# Patient Record
Sex: MALE | Race: WHITE | NOT HISPANIC OR LATINO | Employment: OTHER | URBAN - METROPOLITAN AREA
[De-identification: names, ages, dates, MRNs, and addresses within clinical notes are randomized per-mention and may not be internally consistent; named-entity substitution may affect disease eponyms.]

---

## 2019-09-23 ENCOUNTER — HOSPITAL ENCOUNTER (INPATIENT)
Facility: HOSPITAL | Age: 84
LOS: 2 days | Discharge: HOME | End: 2019-09-25
Attending: INTERNAL MEDICINE | Admitting: INTERNAL MEDICINE

## 2019-09-23 DIAGNOSIS — Z78.9 IMPAIRED MOBILITY AND ADLS: Primary | ICD-10-CM

## 2019-09-23 DIAGNOSIS — Z74.09 IMPAIRED MOBILITY AND ADLS: Primary | ICD-10-CM

## 2019-09-23 PROBLEM — I63.411 CEREBROVASCULAR ACCIDENT (CVA) DUE TO EMBOLISM OF RIGHT MIDDLE CEREBRAL ARTERY (HCC): Status: ACTIVE | Noted: 2019-09-23

## 2019-09-23 PROBLEM — I63.412 CEREBROVASCULAR ACCIDENT (CVA) DUE TO EMBOLISM OF LEFT MIDDLE CEREBRAL ARTERY (HCC): Status: ACTIVE | Noted: 2019-09-23

## 2019-09-23 PROBLEM — J98.4: Status: ACTIVE | Noted: 2019-09-23

## 2019-09-23 PROBLEM — I63.419: Status: ACTIVE | Noted: 2019-09-23

## 2019-09-23 PROBLEM — I48.92 ATRIAL FLUTTER (HCC): Status: ACTIVE | Noted: 2019-09-23

## 2019-09-23 LAB
PA ADP PRP-ACNC: 239 PRU
TROPONIN T SERPL-MCNC: 0.01 NG/ML (ref 0–0.03)

## 2019-09-23 PROCEDURE — 85576 BLOOD PLATELET AGGREGATION: CPT | Performed by: FAMILY MEDICINE

## 2019-09-23 PROCEDURE — 63710000001 INSULIN LISPRO (HUMAN) PER 5 UNITS: Performed by: FAMILY MEDICINE

## 2019-09-23 PROCEDURE — 99222 1ST HOSP IP/OBS MODERATE 55: CPT | Performed by: FAMILY MEDICINE

## 2019-09-23 PROCEDURE — 84484 ASSAY OF TROPONIN QUANT: CPT | Performed by: FAMILY MEDICINE

## 2019-09-23 RX ORDER — LISINOPRIL 10 MG/1
10 TABLET ORAL DAILY
Status: ON HOLD | COMMUNITY
End: 2019-11-16 | Stop reason: SDUPTHER

## 2019-09-23 RX ORDER — NICOTINE POLACRILEX 4 MG
15 LOZENGE BUCCAL
Status: DISCONTINUED | OUTPATIENT
Start: 2019-09-23 | End: 2019-09-23 | Stop reason: SDUPTHER

## 2019-09-23 RX ORDER — SODIUM CHLORIDE 0.9 % (FLUSH) 0.9 %
10 SYRINGE (ML) INJECTION AS NEEDED
Status: DISCONTINUED | OUTPATIENT
Start: 2019-09-23 | End: 2019-09-25 | Stop reason: HOSPADM

## 2019-09-23 RX ORDER — DEXTROSE MONOHYDRATE 25 G/50ML
25 INJECTION, SOLUTION INTRAVENOUS
Status: DISCONTINUED | OUTPATIENT
Start: 2019-09-23 | End: 2019-09-23 | Stop reason: SDUPTHER

## 2019-09-23 RX ORDER — ATORVASTATIN CALCIUM 40 MG/1
80 TABLET, FILM COATED ORAL NIGHTLY
Status: DISCONTINUED | OUTPATIENT
Start: 2019-09-23 | End: 2019-09-25 | Stop reason: HOSPADM

## 2019-09-23 RX ORDER — NICOTINE POLACRILEX 4 MG
15 LOZENGE BUCCAL
Status: DISCONTINUED | OUTPATIENT
Start: 2019-09-23 | End: 2019-09-25 | Stop reason: HOSPADM

## 2019-09-23 RX ORDER — SODIUM CHLORIDE 9 MG/ML
50 INJECTION, SOLUTION INTRAVENOUS CONTINUOUS
Status: DISCONTINUED | OUTPATIENT
Start: 2019-09-23 | End: 2019-09-25

## 2019-09-23 RX ORDER — LORATADINE 10 MG/1
10 TABLET ORAL DAILY
Status: ON HOLD | COMMUNITY
End: 2020-04-21

## 2019-09-23 RX ORDER — DEXTROSE MONOHYDRATE 25 G/50ML
25 INJECTION, SOLUTION INTRAVENOUS
Status: DISCONTINUED | OUTPATIENT
Start: 2019-09-23 | End: 2019-09-25 | Stop reason: HOSPADM

## 2019-09-23 RX ORDER — SIMVASTATIN 10 MG
10 TABLET ORAL DAILY
COMMUNITY
End: 2019-09-25 | Stop reason: HOSPADM

## 2019-09-23 RX ORDER — ASPIRIN 300 MG/1
300 SUPPOSITORY RECTAL DAILY
Status: DISCONTINUED | OUTPATIENT
Start: 2019-09-24 | End: 2019-09-24

## 2019-09-23 RX ORDER — SODIUM CHLORIDE 0.9 % (FLUSH) 0.9 %
10 SYRINGE (ML) INJECTION EVERY 12 HOURS SCHEDULED
Status: DISCONTINUED | OUTPATIENT
Start: 2019-09-23 | End: 2019-09-25 | Stop reason: HOSPADM

## 2019-09-23 RX ORDER — GLIPIZIDE 10 MG/1
10 TABLET ORAL
COMMUNITY
End: 2019-11-16 | Stop reason: HOSPADM

## 2019-09-23 RX ORDER — ASPIRIN 81 MG/1
81 TABLET, CHEWABLE ORAL DAILY
Status: DISCONTINUED | OUTPATIENT
Start: 2019-09-24 | End: 2019-09-24

## 2019-09-23 RX ORDER — LISINOPRIL 10 MG/1
10 TABLET ORAL DAILY
Status: DISCONTINUED | OUTPATIENT
Start: 2019-09-24 | End: 2019-09-25 | Stop reason: HOSPADM

## 2019-09-23 RX ORDER — CLOPIDOGREL BISULFATE 75 MG/1
75 TABLET ORAL DAILY
Status: DISCONTINUED | OUTPATIENT
Start: 2019-09-24 | End: 2019-09-24

## 2019-09-23 RX ADMIN — METOPROLOL TARTRATE 12.5 MG: 25 TABLET, FILM COATED ORAL at 23:03

## 2019-09-23 RX ADMIN — ATORVASTATIN CALCIUM 80 MG: 40 TABLET, FILM COATED ORAL at 23:02

## 2019-09-23 RX ADMIN — SODIUM CHLORIDE, PRESERVATIVE FREE 10 ML: 5 INJECTION INTRAVENOUS at 23:03

## 2019-09-24 ENCOUNTER — APPOINTMENT (OUTPATIENT)
Dept: MRI IMAGING | Facility: HOSPITAL | Age: 84
End: 2019-09-24

## 2019-09-24 ENCOUNTER — APPOINTMENT (OUTPATIENT)
Dept: GENERAL RADIOLOGY | Facility: HOSPITAL | Age: 84
End: 2019-09-24

## 2019-09-24 ENCOUNTER — APPOINTMENT (OUTPATIENT)
Dept: CARDIOLOGY | Facility: HOSPITAL | Age: 84
End: 2019-09-24

## 2019-09-24 ENCOUNTER — APPOINTMENT (OUTPATIENT)
Dept: CT IMAGING | Facility: HOSPITAL | Age: 84
End: 2019-09-24

## 2019-09-24 LAB
ANION GAP SERPL CALCULATED.3IONS-SCNC: 12 MMOL/L (ref 5–15)
BASOPHILS # BLD AUTO: 0.04 10*3/MM3 (ref 0–0.2)
BASOPHILS NFR BLD AUTO: 0.5 % (ref 0–1.5)
BUN BLD-MCNC: 26 MG/DL (ref 8–23)
BUN/CREAT SERPL: 17.4 (ref 7–25)
CALCIUM SPEC-SCNC: 9.7 MG/DL (ref 8.6–10.5)
CHLORIDE SERPL-SCNC: 101 MMOL/L (ref 98–107)
CHOLEST SERPL-MCNC: 136 MG/DL (ref 0–200)
CO2 SERPL-SCNC: 28 MMOL/L (ref 22–29)
CREAT BLD-MCNC: 1.49 MG/DL (ref 0.76–1.27)
DEPRECATED RDW RBC AUTO: 51.8 FL (ref 37–54)
EOSINOPHIL # BLD AUTO: 0.34 10*3/MM3 (ref 0–0.4)
EOSINOPHIL NFR BLD AUTO: 4.1 % (ref 0.3–6.2)
ERYTHROCYTE [DISTWIDTH] IN BLOOD BY AUTOMATED COUNT: 14.4 % (ref 12.3–15.4)
GFR SERPL CREATININE-BSD FRML MDRD: 45 ML/MIN/1.73
GLUCOSE BLD-MCNC: 142 MG/DL (ref 65–99)
GLUCOSE BLDC GLUCOMTR-MCNC: 104 MG/DL (ref 70–130)
GLUCOSE BLDC GLUCOMTR-MCNC: 153 MG/DL (ref 70–130)
GLUCOSE BLDC GLUCOMTR-MCNC: 234 MG/DL (ref 70–130)
GLUCOSE BLDC GLUCOMTR-MCNC: 285 MG/DL (ref 70–130)
HBA1C MFR BLD: 6.6 % (ref 4.8–5.6)
HCT VFR BLD AUTO: 41.7 % (ref 37.5–51)
HDLC SERPL-MCNC: 48 MG/DL (ref 40–60)
HGB BLD-MCNC: 13.1 G/DL (ref 13–17.7)
IMM GRANULOCYTES # BLD AUTO: 0.02 10*3/MM3 (ref 0–0.05)
IMM GRANULOCYTES NFR BLD AUTO: 0.2 % (ref 0–0.5)
IRON 24H UR-MRATE: 56 MCG/DL (ref 59–158)
IRON SATN MFR SERPL: 19 % (ref 20–50)
LDLC SERPL CALC-MCNC: 71 MG/DL (ref 0–100)
LDLC/HDLC SERPL: 1.48 {RATIO}
LYMPHOCYTES # BLD AUTO: 1.93 10*3/MM3 (ref 0.7–3.1)
LYMPHOCYTES NFR BLD AUTO: 23.5 % (ref 19.6–45.3)
MCH RBC QN AUTO: 31 PG (ref 26.6–33)
MCHC RBC AUTO-ENTMCNC: 31.4 G/DL (ref 31.5–35.7)
MCV RBC AUTO: 98.8 FL (ref 79–97)
MONOCYTES # BLD AUTO: 0.67 10*3/MM3 (ref 0.1–0.9)
MONOCYTES NFR BLD AUTO: 8.2 % (ref 5–12)
NEUTROPHILS # BLD AUTO: 5.21 10*3/MM3 (ref 1.7–7)
NEUTROPHILS NFR BLD AUTO: 63.5 % (ref 42.7–76)
NRBC BLD AUTO-RTO: 0 /100 WBC (ref 0–0.2)
PLATELET # BLD AUTO: 194 10*3/MM3 (ref 140–450)
PMV BLD AUTO: 10.4 FL (ref 6–12)
POTASSIUM BLD-SCNC: 5 MMOL/L (ref 3.5–5.2)
RBC # BLD AUTO: 4.22 10*6/MM3 (ref 4.14–5.8)
SODIUM BLD-SCNC: 141 MMOL/L (ref 136–145)
T4 FREE SERPL-MCNC: 1.34 NG/DL (ref 0.93–1.7)
TIBC SERPL-MCNC: 291 MCG/DL (ref 298–536)
TRANSFERRIN SERPL-MCNC: 195 MG/DL (ref 200–360)
TRIGL SERPL-MCNC: 86 MG/DL (ref 0–150)
TROPONIN T SERPL-MCNC: <0.01 NG/ML (ref 0–0.03)
TROPONIN T SERPL-MCNC: <0.01 NG/ML (ref 0–0.03)
TSH SERPL DL<=0.05 MIU/L-ACNC: 6.48 UIU/ML (ref 0.27–4.2)
VLDLC SERPL-MCNC: 17.2 MG/DL
WBC NRBC COR # BLD: 8.21 10*3/MM3 (ref 3.4–10.8)

## 2019-09-24 PROCEDURE — 71250 CT THORAX DX C-: CPT

## 2019-09-24 PROCEDURE — 84466 ASSAY OF TRANSFERRIN: CPT | Performed by: FAMILY MEDICINE

## 2019-09-24 PROCEDURE — 92610 EVALUATE SWALLOWING FUNCTION: CPT

## 2019-09-24 PROCEDURE — 84439 ASSAY OF FREE THYROXINE: CPT | Performed by: INTERNAL MEDICINE

## 2019-09-24 PROCEDURE — 85025 COMPLETE CBC W/AUTO DIFF WBC: CPT | Performed by: FAMILY MEDICINE

## 2019-09-24 PROCEDURE — 83036 HEMOGLOBIN GLYCOSYLATED A1C: CPT | Performed by: FAMILY MEDICINE

## 2019-09-24 PROCEDURE — 84443 ASSAY THYROID STIM HORMONE: CPT | Performed by: FAMILY MEDICINE

## 2019-09-24 PROCEDURE — 80048 BASIC METABOLIC PNL TOTAL CA: CPT | Performed by: FAMILY MEDICINE

## 2019-09-24 PROCEDURE — 70450 CT HEAD/BRAIN W/O DYE: CPT

## 2019-09-24 PROCEDURE — 82962 GLUCOSE BLOOD TEST: CPT

## 2019-09-24 PROCEDURE — 99222 1ST HOSP IP/OBS MODERATE 55: CPT | Performed by: INTERNAL MEDICINE

## 2019-09-24 PROCEDURE — 84484 ASSAY OF TROPONIN QUANT: CPT | Performed by: FAMILY MEDICINE

## 2019-09-24 PROCEDURE — 99233 SBSQ HOSP IP/OBS HIGH 50: CPT | Performed by: INTERNAL MEDICINE

## 2019-09-24 PROCEDURE — 93005 ELECTROCARDIOGRAM TRACING: CPT | Performed by: INTERNAL MEDICINE

## 2019-09-24 PROCEDURE — 74018 RADEX ABDOMEN 1 VIEW: CPT

## 2019-09-24 PROCEDURE — 80061 LIPID PANEL: CPT | Performed by: FAMILY MEDICINE

## 2019-09-24 PROCEDURE — 71045 X-RAY EXAM CHEST 1 VIEW: CPT

## 2019-09-24 PROCEDURE — 93010 ELECTROCARDIOGRAM REPORT: CPT | Performed by: INTERNAL MEDICINE

## 2019-09-24 PROCEDURE — 99223 1ST HOSP IP/OBS HIGH 75: CPT | Performed by: PSYCHIATRY & NEUROLOGY

## 2019-09-24 PROCEDURE — 83540 ASSAY OF IRON: CPT | Performed by: FAMILY MEDICINE

## 2019-09-24 PROCEDURE — 97165 OT EVAL LOW COMPLEX 30 MIN: CPT

## 2019-09-24 PROCEDURE — 97161 PT EVAL LOW COMPLEX 20 MIN: CPT

## 2019-09-24 PROCEDURE — 92523 SPEECH SOUND LANG COMPREHEN: CPT

## 2019-09-24 RX ORDER — CHOLECALCIFEROL (VITAMIN D3) 125 MCG
2.5 CAPSULE ORAL NIGHTLY PRN
Status: DISCONTINUED | OUTPATIENT
Start: 2019-09-24 | End: 2019-09-25 | Stop reason: HOSPADM

## 2019-09-24 RX ADMIN — APIXABAN 5 MG: 5 TABLET, FILM COATED ORAL at 20:49

## 2019-09-24 RX ADMIN — INSULIN LISPRO 3 UNITS: 100 INJECTION, SOLUTION INTRAVENOUS; SUBCUTANEOUS at 12:18

## 2019-09-24 RX ADMIN — LISINOPRIL 10 MG: 10 TABLET ORAL at 09:11

## 2019-09-24 RX ADMIN — CLOPIDOGREL BISULFATE 75 MG: 75 TABLET ORAL at 09:12

## 2019-09-24 RX ADMIN — SODIUM CHLORIDE 50 ML/HR: 9 INJECTION, SOLUTION INTRAVENOUS at 01:24

## 2019-09-24 RX ADMIN — SODIUM CHLORIDE, PRESERVATIVE FREE 10 ML: 5 INJECTION INTRAVENOUS at 20:50

## 2019-09-24 RX ADMIN — INSULIN LISPRO 4 UNITS: 100 INJECTION, SOLUTION INTRAVENOUS; SUBCUTANEOUS at 20:49

## 2019-09-24 RX ADMIN — ASPIRIN 81 MG 81 MG: 81 TABLET ORAL at 09:11

## 2019-09-24 RX ADMIN — METOPROLOL TARTRATE 12.5 MG: 25 TABLET, FILM COATED ORAL at 09:12

## 2019-09-24 RX ADMIN — SODIUM CHLORIDE 50 ML/HR: 9 INJECTION, SOLUTION INTRAVENOUS at 12:00

## 2019-09-24 RX ADMIN — ATORVASTATIN CALCIUM 80 MG: 40 TABLET, FILM COATED ORAL at 20:47

## 2019-09-24 RX ADMIN — MELATONIN TAB 5 MG 2.5 MG: 5 TAB at 20:47

## 2019-09-24 RX ADMIN — METOPROLOL TARTRATE 12.5 MG: 25 TABLET, FILM COATED ORAL at 20:47

## 2019-09-25 ENCOUNTER — APPOINTMENT (OUTPATIENT)
Dept: CARDIOLOGY | Facility: HOSPITAL | Age: 84
End: 2019-09-25

## 2019-09-25 VITALS
HEART RATE: 73 BPM | HEIGHT: 69 IN | DIASTOLIC BLOOD PRESSURE: 60 MMHG | WEIGHT: 154 LBS | RESPIRATION RATE: 16 BRPM | TEMPERATURE: 98 F | SYSTOLIC BLOOD PRESSURE: 134 MMHG | BODY MASS INDEX: 22.81 KG/M2 | OXYGEN SATURATION: 96 %

## 2019-09-25 PROBLEM — I65.22 OCCLUSION OF LEFT CAROTID ARTERY: Status: ACTIVE | Noted: 2019-09-25

## 2019-09-25 LAB
ANION GAP SERPL CALCULATED.3IONS-SCNC: 14 MMOL/L (ref 5–15)
ASCENDING AORTA: 3.2 CM
BH CV ECHO MEAS - AO MAX PG (FULL): 2.1 MMHG
BH CV ECHO MEAS - AO MAX PG: 4.3 MMHG
BH CV ECHO MEAS - AO MEAN PG (FULL): 1.4 MMHG
BH CV ECHO MEAS - AO MEAN PG: 2.3 MMHG
BH CV ECHO MEAS - AO ROOT AREA (BSA CORRECTED): 1.8
BH CV ECHO MEAS - AO ROOT AREA: 8.4 CM^2
BH CV ECHO MEAS - AO ROOT DIAM: 3.3 CM
BH CV ECHO MEAS - AO V2 MAX: 103.2 CM/SEC
BH CV ECHO MEAS - AO V2 MEAN: 71.2 CM/SEC
BH CV ECHO MEAS - AO V2 VTI: 19.6 CM
BH CV ECHO MEAS - ASC AORTA: 3.2 CM
BH CV ECHO MEAS - AVA(I,A): 2.8 CM^2
BH CV ECHO MEAS - AVA(I,D): 2.8 CM^2
BH CV ECHO MEAS - AVA(V,A): 2.8 CM^2
BH CV ECHO MEAS - AVA(V,D): 2.8 CM^2
BH CV ECHO MEAS - BSA(HAYCOCK): 1.8 M^2
BH CV ECHO MEAS - BSA(HAYCOCK): 1.8 M^2
BH CV ECHO MEAS - BSA: 1.8 M^2
BH CV ECHO MEAS - BSA: 1.8 M^2
BH CV ECHO MEAS - BZI_BMI: 22.7 KILOGRAMS/M^2
BH CV ECHO MEAS - BZI_BMI: 22.7 KILOGRAMS/M^2
BH CV ECHO MEAS - BZI_METRIC_HEIGHT: 175.3 CM
BH CV ECHO MEAS - BZI_METRIC_HEIGHT: 175.3 CM
BH CV ECHO MEAS - BZI_METRIC_WEIGHT: 69.9 KG
BH CV ECHO MEAS - BZI_METRIC_WEIGHT: 69.9 KG
BH CV ECHO MEAS - EDV(CUBED): 51.2 ML
BH CV ECHO MEAS - EDV(MOD-SP2): 24 ML
BH CV ECHO MEAS - EDV(MOD-SP4): 42 ML
BH CV ECHO MEAS - EDV(TEICH): 58.6 ML
BH CV ECHO MEAS - EF(CUBED): 63.2 %
BH CV ECHO MEAS - EF(MOD-BP): 60 %
BH CV ECHO MEAS - EF(MOD-SP2): 79.2 %
BH CV ECHO MEAS - EF(MOD-SP4): 59.5 %
BH CV ECHO MEAS - EF(TEICH): 55.5 %
BH CV ECHO MEAS - ESV(CUBED): 18.8 ML
BH CV ECHO MEAS - ESV(MOD-SP2): 5 ML
BH CV ECHO MEAS - ESV(MOD-SP4): 17 ML
BH CV ECHO MEAS - ESV(TEICH): 26.1 ML
BH CV ECHO MEAS - FS: 28.3 %
BH CV ECHO MEAS - IVS/LVPW: 0.98
BH CV ECHO MEAS - IVSD: 1.2 CM
BH CV ECHO MEAS - LA DIMENSION: 4 CM
BH CV ECHO MEAS - LA/AO: 1
BH CV ECHO MEAS - LAD MAJOR: 7.3 CM
BH CV ECHO MEAS - LAT PEAK E' VEL: 10.4 CM/SEC
BH CV ECHO MEAS - LATERAL E/E' RATIO: 9.4
BH CV ECHO MEAS - LV DIASTOLIC VOL/BSA (35-75): 22.7 ML/M^2
BH CV ECHO MEAS - LV MASS(C)D: 148.1 GRAMS
BH CV ECHO MEAS - LV MASS(C)DI: 80.1 GRAMS/M^2
BH CV ECHO MEAS - LV MAX PG: 2.2 MMHG
BH CV ECHO MEAS - LV MEAN PG: 0.87 MMHG
BH CV ECHO MEAS - LV SYSTOLIC VOL/BSA (12-30): 9.2 ML/M^2
BH CV ECHO MEAS - LV V1 MAX: 74 CM/SEC
BH CV ECHO MEAS - LV V1 MEAN: 42.7 CM/SEC
BH CV ECHO MEAS - LV V1 VTI: 14.1 CM
BH CV ECHO MEAS - LVIDD: 3.7 CM
BH CV ECHO MEAS - LVIDS: 2.7 CM
BH CV ECHO MEAS - LVLD AP2: 6.1 CM
BH CV ECHO MEAS - LVLD AP4: 6.4 CM
BH CV ECHO MEAS - LVLS AP2: 5.3 CM
BH CV ECHO MEAS - LVLS AP4: 5.6 CM
BH CV ECHO MEAS - LVOT AREA (M): 3.8 CM^2
BH CV ECHO MEAS - LVOT AREA: 3.8 CM^2
BH CV ECHO MEAS - LVOT DIAM: 2.2 CM
BH CV ECHO MEAS - LVPWD: 1.2 CM
BH CV ECHO MEAS - MED PEAK E' VEL: 7.2 CM/SEC
BH CV ECHO MEAS - MEDIAL E/E' RATIO: 13.5
BH CV ECHO MEAS - MV A MAX VEL: 60.7 CM/SEC
BH CV ECHO MEAS - MV DEC SLOPE: 677 CM/SEC^2
BH CV ECHO MEAS - MV E MAX VEL: 99.7 CM/SEC
BH CV ECHO MEAS - MV E/A: 1.6
BH CV ECHO MEAS - MV MAX PG: 3.9 MMHG
BH CV ECHO MEAS - MV MEAN PG: 1.7 MMHG
BH CV ECHO MEAS - MV V2 MAX: 98.8 CM/SEC
BH CV ECHO MEAS - MV V2 MEAN: 62.9 CM/SEC
BH CV ECHO MEAS - MV V2 VTI: 18.2 CM
BH CV ECHO MEAS - MVA(VTI): 3 CM^2
BH CV ECHO MEAS - PA ACC SLOPE: 729.9 CM/SEC^2
BH CV ECHO MEAS - PA ACC TIME: 0.1 SEC
BH CV ECHO MEAS - PA MAX PG: 3.6 MMHG
BH CV ECHO MEAS - PA PR(ACCEL): 33.1 MMHG
BH CV ECHO MEAS - PA V2 MAX: 94.5 CM/SEC
BH CV ECHO MEAS - PI END-D VEL: 121.6 CM/SEC
BH CV ECHO MEAS - SI(AO): 88.7 ML/M^2
BH CV ECHO MEAS - SI(CUBED): 17.5 ML/M^2
BH CV ECHO MEAS - SI(LVOT): 29.3 ML/M^2
BH CV ECHO MEAS - SI(MOD-SP2): 10.3 ML/M^2
BH CV ECHO MEAS - SI(MOD-SP4): 13.5 ML/M^2
BH CV ECHO MEAS - SI(TEICH): 17.6 ML/M^2
BH CV ECHO MEAS - SV(AO): 164 ML
BH CV ECHO MEAS - SV(CUBED): 32.3 ML
BH CV ECHO MEAS - SV(LVOT): 54.2 ML
BH CV ECHO MEAS - SV(MOD-SP2): 19 ML
BH CV ECHO MEAS - SV(MOD-SP4): 25 ML
BH CV ECHO MEAS - SV(TEICH): 32.6 ML
BH CV ECHO MEASUREMENTS AVERAGE E/E' RATIO: 11.33
BH CV VAS BP RIGHT ARM: NORMAL MMHG
BH CV XLRA - RV BASE: 3.6 CM
BH CV XLRA - RV LENGTH: 4.9 CM
BH CV XLRA - RV MID: 2.5 CM
BH CV XLRA - TDI S': 8.66 CM/SEC
BH CV XLRA MEAS LEFT DIST CCA EDV: 9.5 CM/SEC
BH CV XLRA MEAS LEFT DIST CCA PSV: 38.6 CM/SEC
BH CV XLRA MEAS LEFT MID CCA EDV: 8.3 CM/SEC
BH CV XLRA MEAS LEFT MID CCA PSV: 68.8 CM/SEC
BH CV XLRA MEAS LEFT PROX CCA PSV: 89.6 CM/SEC
BH CV XLRA MEAS LEFT PROX ECA PSV: 314.7 CM/SEC
BH CV XLRA MEAS LEFT PROX SCLA PSV: 157.8 CM/SEC
BH CV XLRA MEAS LEFT VERTEBRAL A EDV: 6.6 CM/SEC
BH CV XLRA MEAS LEFT VERTEBRAL A PSV: 33.8 CM/SEC
BH CV XLRA MEAS RIGHT CCA RATIO VEL: 98.8 CM/SEC
BH CV XLRA MEAS RIGHT DIST CCA EDV: 23.1 CM/SEC
BH CV XLRA MEAS RIGHT DIST CCA PSV: 99.5 CM/SEC
BH CV XLRA MEAS RIGHT DIST ICA EDV: 22.2 CM/SEC
BH CV XLRA MEAS RIGHT DIST ICA PSV: 90.4 CM/SEC
BH CV XLRA MEAS RIGHT ICA RATIO VEL: 244 CM/SEC
BH CV XLRA MEAS RIGHT ICA/CCA RATIO: 2.5
BH CV XLRA MEAS RIGHT MID CCA EDV: 22.4 CM/SEC
BH CV XLRA MEAS RIGHT MID CCA PSV: 99.5 CM/SEC
BH CV XLRA MEAS RIGHT MID ICA EDV: 60.8 CM/SEC
BH CV XLRA MEAS RIGHT MID ICA PSV: 221 CM/SEC
BH CV XLRA MEAS RIGHT PROX CCA EDV: 19.8 CM/SEC
BH CV XLRA MEAS RIGHT PROX CCA PSV: 87.6 CM/SEC
BH CV XLRA MEAS RIGHT PROX ECA PSV: 156.6 CM/SEC
BH CV XLRA MEAS RIGHT PROX ICA EDV: 71.2 CM/SEC
BH CV XLRA MEAS RIGHT PROX ICA PSV: 234.3 CM/SEC
BH CV XLRA MEAS RIGHT PROX SCLA PSV: 180.1 CM/SEC
BH CV XLRA MEAS RIGHT VERTEBRAL A EDV: 14.1 CM/SEC
BH CV XLRA MEAS RIGHT VERTEBRAL A PSV: 44.1 CM/SEC
BUN BLD-MCNC: 36 MG/DL (ref 8–23)
BUN/CREAT SERPL: 25.4 (ref 7–25)
CALCIUM SPEC-SCNC: 8.7 MG/DL (ref 8.6–10.5)
CHLORIDE SERPL-SCNC: 103 MMOL/L (ref 98–107)
CO2 SERPL-SCNC: 22 MMOL/L (ref 22–29)
CREAT BLD-MCNC: 1.42 MG/DL (ref 0.76–1.27)
DEPRECATED RDW RBC AUTO: 50.4 FL (ref 37–54)
ERYTHROCYTE [DISTWIDTH] IN BLOOD BY AUTOMATED COUNT: 13.9 % (ref 12.3–15.4)
GFR SERPL CREATININE-BSD FRML MDRD: 47 ML/MIN/1.73
GLUCOSE BLD-MCNC: 132 MG/DL (ref 65–99)
GLUCOSE BLDC GLUCOMTR-MCNC: 143 MG/DL (ref 70–130)
GLUCOSE BLDC GLUCOMTR-MCNC: 285 MG/DL (ref 70–130)
HCT VFR BLD AUTO: 36.9 % (ref 37.5–51)
HGB BLD-MCNC: 11.7 G/DL (ref 13–17.7)
LEFT ATRIUM VOLUME INDEX: 34.6 ML/M2
LV EF 2D ECHO EST: 58 %
MCH RBC QN AUTO: 31.1 PG (ref 26.6–33)
MCHC RBC AUTO-ENTMCNC: 31.7 G/DL (ref 31.5–35.7)
MCV RBC AUTO: 98.1 FL (ref 79–97)
PLATELET # BLD AUTO: 180 10*3/MM3 (ref 140–450)
PMV BLD AUTO: 10.5 FL (ref 6–12)
POTASSIUM BLD-SCNC: 4.4 MMOL/L (ref 3.5–5.2)
RBC # BLD AUTO: 3.76 10*6/MM3 (ref 4.14–5.8)
RIGHT ARM BP: NORMAL MMHG
SODIUM BLD-SCNC: 139 MMOL/L (ref 136–145)
WBC NRBC COR # BLD: 7.27 10*3/MM3 (ref 3.4–10.8)

## 2019-09-25 PROCEDURE — 93010 ELECTROCARDIOGRAM REPORT: CPT | Performed by: INTERNAL MEDICINE

## 2019-09-25 PROCEDURE — 80048 BASIC METABOLIC PNL TOTAL CA: CPT | Performed by: INTERNAL MEDICINE

## 2019-09-25 PROCEDURE — 99231 SBSQ HOSP IP/OBS SF/LOW 25: CPT | Performed by: PSYCHIATRY & NEUROLOGY

## 2019-09-25 PROCEDURE — 93005 ELECTROCARDIOGRAM TRACING: CPT | Performed by: NURSE PRACTITIONER

## 2019-09-25 PROCEDURE — 99232 SBSQ HOSP IP/OBS MODERATE 35: CPT | Performed by: INTERNAL MEDICINE

## 2019-09-25 PROCEDURE — 93880 EXTRACRANIAL BILAT STUDY: CPT | Performed by: INTERNAL MEDICINE

## 2019-09-25 PROCEDURE — 93880 EXTRACRANIAL BILAT STUDY: CPT

## 2019-09-25 PROCEDURE — 82962 GLUCOSE BLOOD TEST: CPT

## 2019-09-25 PROCEDURE — 85027 COMPLETE CBC AUTOMATED: CPT | Performed by: INTERNAL MEDICINE

## 2019-09-25 PROCEDURE — 99239 HOSP IP/OBS DSCHRG MGMT >30: CPT | Performed by: INTERNAL MEDICINE

## 2019-09-25 PROCEDURE — 93306 TTE W/DOPPLER COMPLETE: CPT

## 2019-09-25 PROCEDURE — 93306 TTE W/DOPPLER COMPLETE: CPT | Performed by: INTERNAL MEDICINE

## 2019-09-25 RX ORDER — ATORVASTATIN CALCIUM 80 MG/1
80 TABLET, FILM COATED ORAL NIGHTLY
Qty: 30 TABLET | Refills: 0 | Status: SHIPPED | OUTPATIENT
Start: 2019-09-25 | End: 2019-10-17 | Stop reason: SDUPTHER

## 2019-09-25 RX ORDER — DONEPEZIL HYDROCHLORIDE 10 MG/1
5 TABLET, FILM COATED ORAL NIGHTLY
Status: DISCONTINUED | OUTPATIENT
Start: 2019-09-25 | End: 2019-09-25 | Stop reason: HOSPADM

## 2019-09-25 RX ORDER — DONEPEZIL HYDROCHLORIDE 5 MG/1
5 TABLET, FILM COATED ORAL NIGHTLY
Qty: 30 TABLET | Refills: 0 | Status: SHIPPED | OUTPATIENT
Start: 2019-09-25 | End: 2019-10-17 | Stop reason: SDUPTHER

## 2019-09-25 RX ORDER — ASPIRIN 81 MG/1
81 TABLET ORAL DAILY
Status: DISCONTINUED | OUTPATIENT
Start: 2019-09-25 | End: 2019-09-25 | Stop reason: HOSPADM

## 2019-09-25 RX ORDER — ASPIRIN 81 MG/1
81 TABLET ORAL DAILY
Qty: 30 TABLET | Refills: 0 | Status: ON HOLD | OUTPATIENT
Start: 2019-09-25 | End: 2019-11-15

## 2019-09-25 RX ADMIN — APIXABAN 5 MG: 5 TABLET, FILM COATED ORAL at 09:05

## 2019-09-25 RX ADMIN — SODIUM CHLORIDE, PRESERVATIVE FREE 10 ML: 5 INJECTION INTRAVENOUS at 09:06

## 2019-09-25 RX ADMIN — LISINOPRIL 10 MG: 10 TABLET ORAL at 09:05

## 2019-09-25 RX ADMIN — METOPROLOL TARTRATE 12.5 MG: 25 TABLET, FILM COATED ORAL at 09:05

## 2019-09-25 RX ADMIN — INSULIN LISPRO 4 UNITS: 100 INJECTION, SOLUTION INTRAVENOUS; SUBCUTANEOUS at 12:19

## 2019-09-26 ENCOUNTER — READMISSION MANAGEMENT (OUTPATIENT)
Dept: CALL CENTER | Facility: HOSPITAL | Age: 84
End: 2019-09-26

## 2019-09-30 ENCOUNTER — READMISSION MANAGEMENT (OUTPATIENT)
Dept: CALL CENTER | Facility: HOSPITAL | Age: 84
End: 2019-09-30

## 2019-09-30 NOTE — OUTREACH NOTE
Stroke Week 1 Survey      Responses   Facility patient discharged from?  [facility]   Does the patient have one of the following disease processes/diagnoses(primary or secondary)?  Stroke (TIA)   Is there a successful TCM telephone encounter documented?  No   Week 1 attempt successful?  Yes   Call start time  1218   Call end time  1220   Discharge diagnosis  CVA   Is patient permission given to speak with other caregiver?  Yes   Person spoke with today (if not patient) and relationship  [place] Daughter   [phone]    Meds reviewed with patient/caregiver?  Yes   Is the patient having any side effects they believe may be caused by any medication additions or changes?  No   Does the patient have all medications ordered at discharge?  Yes   Is the patient taking all medications as directed (includes completed medication regime)?  Yes   Does the patient have a primary care provider?   Yes   Does the patient have an appointment with their PCP within 7 days of discharge?  Yes   Has the patient kept scheduled appointments due by today?  Yes   Has home health visited the patient within 72 hours of discharge?  N/A   Psychosocial issues?  No   Does the patient have any residual symptoms from stroke/TIA?  Yes   Does the patient understand the diet ordered at discharge?  Yes   Did the patient receive a copy of their discharge instructions?  Yes   Nursing interventions  Reviewed instructions with patient   What is the patient's perception of their health status since discharge?  Improving   Nursing interventions  Nurse provided patient education   Is the patient able to teach back FAST for Stroke?  Yes   Is the patient/caregiver able to teach back the risk factors for a stroke?  High Cholesterol   Is the patient/caregiver able to teach back signs and symptoms related to disease process for when to call PCP?  Yes   Is the patient/caregiver able to teach back signs and symptoms related to disease process for when to call 911?   Yes   Is the patient/caregiver able to teach back the hierarchy of who to call/visit for symptoms/problems? PCP, Specialist, Home health nurse, Urgent Care, ED, 911  Yes   Additional teach back comments  Daughter is well educated on CVA as her mother also had it before.   Week 1 call completed?  Yes          Victor Manuel Zamarripa RN

## 2019-10-08 ENCOUNTER — READMISSION MANAGEMENT (OUTPATIENT)
Dept: CALL CENTER | Facility: HOSPITAL | Age: 84
End: 2019-10-08

## 2019-10-08 NOTE — OUTREACH NOTE
Stroke Week 2 Survey      Responses   Facility patient discharged from?  [facility]   Does the patient have one of the following disease processes/diagnoses(primary or secondary)?  Stroke (TIA)   Week 2 attempt successful?  No   Unsuccessful attempts  Attempt 1          Keli Younger RN

## 2019-10-09 ENCOUNTER — READMISSION MANAGEMENT (OUTPATIENT)
Dept: CALL CENTER | Facility: HOSPITAL | Age: 84
End: 2019-10-09

## 2019-10-09 NOTE — OUTREACH NOTE
Stroke Week 2 Survey      Responses   Facility patient discharged from?  [facility]   Does the patient have one of the following disease processes/diagnoses(primary or secondary)?  Stroke (TIA)   Week 2 attempt successful?  No   Unsuccessful attempts  Attempt 2          Lyndsey Colbret RN

## 2019-10-11 ENCOUNTER — READMISSION MANAGEMENT (OUTPATIENT)
Dept: CALL CENTER | Facility: HOSPITAL | Age: 84
End: 2019-10-11

## 2019-10-11 NOTE — OUTREACH NOTE
Stroke Week 3 Survey      Responses   Facility patient discharged from?  [facility]   Does the patient have one of the following disease processes/diagnoses(primary or secondary)?  Stroke (TIA)   Week 3 attempt successful?  No   Unsuccessful attempts  Attempt 1          Farzana Gomez RN

## 2019-10-12 ENCOUNTER — READMISSION MANAGEMENT (OUTPATIENT)
Dept: CALL CENTER | Facility: HOSPITAL | Age: 84
End: 2019-10-12

## 2019-10-12 NOTE — OUTREACH NOTE
Stroke Week 3 Survey      Responses   Facility patient discharged from?  [facility]   Does the patient have one of the following disease processes/diagnoses(primary or secondary)?  Stroke (TIA)   Week 3 attempt successful?  Yes   Call start time  1611   Call end time  1615   Discharge diagnosis  CVA   Is patient permission given to speak with other caregiver?  Yes   Person spoke with today (if not patient) and relationship  SIERRA Daughter   [phone]    Meds reviewed with patient/caregiver?  Yes   Is the patient having any side effects they believe may be caused by any medication additions or changes?  No   Does the patient have all medications ordered at discharge?  Yes   Is the patient taking all medications as directed (includes completed medication regime)?  Yes   Does the patient have a primary care provider?   Yes   Comments regarding PCP  Daughter states pt has seen PCP and report appt went well   Has the patient kept scheduled appointments due by today?  Yes   Psychosocial issues?  No   Does the patient require any assistance with activities of daily living such as eating, bathing, dressing, walking, etc.?  No   Does the patient have any residual symptoms from stroke/TIA?  No   Does the patient understand the diet ordered at discharge?  Yes   Did the patient receive a copy of their discharge instructions?  Yes   Nursing interventions  Reviewed instructions with patient   What is the patient's perception of their health status since discharge?  Improving   Is the patient able to teach back FAST for Stroke?  Yes   Is the patient/caregiver able to teach back the risk factors for a stroke?  High blood pressure-goal below 120/80, Smoking, Carotid or other artery disease   Is the patient/caregiver able to teach back signs and symptoms related to disease process for when to call PCP?  Yes   Is the patient/caregiver able to teach back signs and symptoms related to disease process for when to call 911?  Yes   Is  the patient/caregiver able to teach back the hierarchy of who to call/visit for symptoms/problems? PCP, Specialist, Home health nurse, Urgent Care, ED, 911  Yes   Week 3 call completed?  Yes   Wrap up additional comments  Daughter denies any questions at this time.           Farzana Gomez, RN

## 2019-10-17 RX ORDER — ATORVASTATIN CALCIUM 80 MG/1
80 TABLET, FILM COATED ORAL NIGHTLY
Qty: 30 TABLET | Refills: 0 | Status: SHIPPED | OUTPATIENT
Start: 2019-10-17 | End: 2019-10-17

## 2019-10-17 RX ORDER — ATORVASTATIN CALCIUM 80 MG/1
80 TABLET, FILM COATED ORAL NIGHTLY
Qty: 30 TABLET | Refills: 0 | Status: SHIPPED | OUTPATIENT
Start: 2019-10-17 | End: 2019-11-18 | Stop reason: SDUPTHER

## 2019-10-17 RX ORDER — DONEPEZIL HYDROCHLORIDE 5 MG/1
5 TABLET, FILM COATED ORAL NIGHTLY
Qty: 30 TABLET | Refills: 0 | Status: SHIPPED | OUTPATIENT
Start: 2019-10-17 | End: 2019-10-17

## 2019-10-17 RX ORDER — DONEPEZIL HYDROCHLORIDE 5 MG/1
5 TABLET, FILM COATED ORAL NIGHTLY
Qty: 30 TABLET | Refills: 0 | Status: SHIPPED | OUTPATIENT
Start: 2019-10-17 | End: 2019-11-16 | Stop reason: HOSPADM

## 2019-10-17 NOTE — TELEPHONE ENCOUNTER
Pt has HFU on 10/25/19 with [facility]. Refill for 30 days on these medications to get pt to appt.     Sent to [facility] per pt's daughters request

## 2019-10-21 ENCOUNTER — READMISSION MANAGEMENT (OUTPATIENT)
Dept: CALL CENTER | Facility: HOSPITAL | Age: 84
End: 2019-10-21

## 2019-10-21 NOTE — OUTREACH NOTE
Stroke Week 4 Survey      Responses   Facility patient discharged from?  [facility]   Does the patient have one of the following disease processes/diagnoses(primary or secondary)?  Stroke (TIA)   Week 4 attempt successful?  Yes   Call start time  0937   Call end time  0940   Discharge diagnosis  CVA   Is patient permission given to speak with other caregiver?  Yes   List who call center can speak with  [place]   Person spoke with today (if not patient) and relationship  [place] Daughter   [phone]    Meds reviewed with patient/caregiver?  Yes   Is the patient taking all medications as directed (includes completed medication regime)?  Yes   Has the patient kept scheduled appointments due by today?  Yes   Is the patient still receiving Home Health Services?  No   Does the patient require any assistance with activities of daily living such as eating, bathing, dressing, walking, etc.?  Yes   Does the patient have any residual symptoms from stroke/TIA?  Yes   Does the patient understand the diet ordered at discharge?  Yes   What is the patient's perception of their health status since discharge?  Returned to baseline/stable   Is the patient able to teach back FAST for Stroke?  Yes   Is the patient/caregiver able to teach back the risk factors for a stroke?  High blood pressure-goal below 120/80, Carotid or other artery disease   Is the patient/caregiver able to teach back signs and symptoms related to disease process for when to call PCP?  Yes   Is the patient/caregiver able to teach back signs and symptoms related to disease process for when to call 911?  Yes   Is the patient/caregiver able to teach back the hierarchy of who to call/visit for symptoms/problems? PCP, Specialist, Home health nurse, Urgent Care, ED, 911  Yes   Additional teach back comments  Daughter reports pt is back to baseline and denies any deficits.    Week 4 Call Completed?  Yes   Would the patient like one additional call?  No   Graduated  Yes    Did the patient feel the follow up calls were helpful during their recovery period?  Yes   Was the number of calls appropriate?  Yes          Keli Younger RN

## 2019-10-25 ENCOUNTER — OFFICE VISIT (OUTPATIENT)
Dept: CARDIOLOGY | Facility: CLINIC | Age: 84
End: 2019-10-25

## 2019-10-25 VITALS
SYSTOLIC BLOOD PRESSURE: 136 MMHG | DIASTOLIC BLOOD PRESSURE: 78 MMHG | WEIGHT: 155 LBS | HEIGHT: 69 IN | HEART RATE: 63 BPM | BODY MASS INDEX: 22.96 KG/M2

## 2019-10-25 DIAGNOSIS — I10 ESSENTIAL HYPERTENSION: ICD-10-CM

## 2019-10-25 DIAGNOSIS — I48.3 TYPICAL ATRIAL FLUTTER (HCC): ICD-10-CM

## 2019-10-25 DIAGNOSIS — I63.412 CEREBROVASCULAR ACCIDENT (CVA) DUE TO EMBOLISM OF LEFT MIDDLE CEREBRAL ARTERY (HCC): Primary | ICD-10-CM

## 2019-10-25 DIAGNOSIS — N18.30 CKD (CHRONIC KIDNEY DISEASE) STAGE 3, GFR 30-59 ML/MIN (HCC): ICD-10-CM

## 2019-10-25 DIAGNOSIS — E11.49 TYPE 2 DIABETES MELLITUS WITH OTHER NEUROLOGIC COMPLICATION, WITHOUT LONG-TERM CURRENT USE OF INSULIN (HCC): ICD-10-CM

## 2019-10-25 PROCEDURE — 99214 OFFICE O/P EST MOD 30 MIN: CPT | Performed by: INTERNAL MEDICINE

## 2019-10-25 NOTE — PROGRESS NOTES
Subjective:     Encounter Date:10/25/2019    Patient ID: Adriano Torres is an 88 y.o.  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    Chief Complaint:   Chief Complaint   Patient presents with   • Hypertension     Problem List:  1. Paroxysmal atrial flutter  a. CHADsVasc 6, anticoagulated with ASA and plavix  b. Abnormal acceptable echocardiogram with plan for anticoagulation and outpatient Multaq/ECV  2. Hypertension  3. Hyperlipidemia; on statin therapy  4. Hypothyroidism, TSH 6.48 on admission  5. Type 2 diabetes mellitus; hemoglobin A1c 6.6%, September 2019  6. Chronic kidney disease stage III  7. Vascular dementia  8. Osteoarthritis  9. Former tobacco use: 1 pack/day x 15 years, quit over 20 years ago  10. TIA  a. Altered mental status with dysarthria, right-sided weakness, and facial drooping with negative CT scan of the head at OSH  b. CT head 9/24/2019: Senescent changes without acute abnormality, 2-3 mm metallic foreign body immediately adjacent to the left lobe which precludes planned MRI of the brain  11. Surgical history: None    Allergies   Allergen Reactions   • Pollen Extract Unknown (See Comments)     rhinitis         Current Outpatient Medications:   •  apixaban (ELIQUIS) 5 MG tablet tablet, Take 1 tablet by mouth Every 12 (Twelve) Hours., Disp: 60 tablet, Rfl: 0  •  aspirin 81 MG EC tablet, Take 1 tablet by mouth Daily., Disp: 30 tablet, Rfl: 0  •  atorvastatin (LIPITOR) 80 MG tablet, Take 1 tablet by mouth Every Night., Disp: 30 tablet, Rfl: 0  •  donepezil (ARICEPT) 5 MG tablet, Take 1 tablet by mouth Every Night., Disp: 30 tablet, Rfl: 0  •  glipiZIDE (GLUCOTROL) 10 MG tablet, Take 10 mg by mouth Daily., Disp: , Rfl:   •  lisinopril (PRINIVIL,ZESTRIL) 10 MG tablet, Take 10 mg by mouth Daily., Disp: , Rfl:   •  loratadine (CLARITIN) 10 MG tablet, Take 10 mg by mouth  "Daily., Disp: , Rfl:   •  metFORMIN (GLUCOPHAGE) 500 MG tablet, Take 500 mg by mouth 2 (Two) Times a Day With Meals., Disp: , Rfl:   •  metoprolol tartrate (LOPRESSOR) 25 MG tablet, Take 0.5 tablets by mouth Every 12 (Twelve) Hours., Disp: 30 tablet, Rfl: 0    HISTORY OF PRESENT ILLNESS: Patient presents as a 1-month hospital followup after a 3-day [facility] admission for stroke due to embolism of middle cerebral artery in late September 2019.  He says that he has done well since being discharged from the hospital.  He is up and about every day and has no chest pain, tightness, or pressure and has not noticed his heart skipping or going fast.  He says \"for my age, I'm in really good shape.\"  He is accompanied to the office today by 2 of his daughters, and one of them says she has him walk on the treadmill for about 5-10 minutes when it is wet or cold outside.  He says \"I can walk as far as I want to.\"  He has had no problems with his appetite and has had no fevers or chills.  His daughters monitor his blood pressure and sugar at home, and they state his blood pressure is always \"normal\" (130s systolic).  He has already had a flu shot this year.  Patient otherwise denies chest pain, shortness of breath, PND, edema, palpitations, syncope or presyncope at this time.  His daughter asks if he can discontinue aspirin, and they are advised that he may do so.        ROS   All other systems reviewed and otherwise negative.    Procedures       Objective:       Vitals:    10/25/19 1015 10/25/19 1017 10/25/19 1038   BP: 153/75 157/63 136/78   BP Location: Left arm Left arm Right arm   Patient Position: Sitting Standing Sitting   Pulse: 72 63    Weight: 70.3 kg (155 lb)     Height: 175.3 cm (69\")       Body mass index is 22.89 kg/m².   Last weight:  154 lbs (while in hospital on 09/24/2019)    Physical Exam   Constitutional: He is oriented to person, place, and time. He appears well-developed and well-nourished.   Neck: No JVD " present. Carotid bruit is not present. No thyromegaly present.   Cardiovascular: Regular rhythm, S1 normal and S2 normal.  Occasional extrasystoles are present. Exam reveals no gallop, no S3 and no friction rub.   Murmur heard.   Harsh early systolic murmur is present with a grade of 1/6 at the upper right sternal border.  Pulses:       Carotid pulses are 1+ on the right side, and 1+ on the left side.       Radial pulses are 1+ on the right side, and 1+ on the left side.        Femoral pulses are 1+ on the right side, and 1+ on the left side.       Popliteal pulses are 1+ on the right side, and 1+ on the left side.        Dorsalis pedis pulses are 1+ on the right side, and 1+ on the left side.        Posterior tibial pulses are 1+ on the right side, and 1+ on the left side.   Pulmonary/Chest: Effort normal. He has decreased breath sounds. He has no wheezes. He has no rhonchi. He has no rales.   Abdominal: Soft. He exhibits no mass. There is no hepatosplenomegaly. There is no tenderness. There is no guarding.   Bowel sounds audible x4   Musculoskeletal: Normal range of motion. He exhibits no edema.   Lymphadenopathy:     He has no cervical adenopathy.   Neurological: He is alert and oriented to person, place, and time.   Skin: Skin is warm, dry and intact. No rash noted.   Vitals reviewed.        Lab Review:   Lab Results   Component Value Date    GLUCOSE 132 (H) 09/25/2019    BUN 36 (H) 09/25/2019    CREATININE 1.42 (H) 09/25/2019    EGFRIFNONA 47 (L) 09/25/2019    BCR 25.4 (H) 09/25/2019    CO2 22.0 09/25/2019    CALCIUM 8.7 09/25/2019       Lab Results   Component Value Date    WBC 7.27 09/25/2019    HGB 11.7 (L) 09/25/2019    HCT 36.9 (L) 09/25/2019    MCV 98.1 (H) 09/25/2019     09/25/2019       Lab Results   Component Value Date    HGBA1C 6.60 (H) 09/24/2019       Lab Results   Component Value Date    TSH 6.480 (H) 09/24/2019       Lab Results   Component Value Date    CHOL 136 09/24/2019     Lab Results    Component Value Date    TRIG 86 09/24/2019     Lab Results   Component Value Date    HDL 48 09/24/2019     Lab Results   Component Value Date    LDL 71 09/24/2019 09/25/2019:  · Echocardiogram:  · Left ventricular wall thickness is consistent with mild concentric hypertrophy.  · Estimated EF = 58%.  · Left ventricular systolic function is normal.  · Left atrial cavity size is mildly dilated.  · Normal right ventricular cavity size, wall thickness, systolic function and septal motion noted.  · Saline test results are negative.  · The aortic valve exhibits mild sclerosis.  · Mild MAC is present.  · No evidence of pulmonary hypertension is present.  · There is no evidence of pericardial effusion.  · Carotid artery duplex:  · Right internal carotid artery stenosis of >70%.  · Left internal carotid artery stenosis totally occluded.  · Nominal antegrade bilateral vertebral artery flow demonstrated.  · Severity of CARMEN stenosis may be overestimated secondary to LICA occlusion.        Assessment:   Overall continued acceptable course with no new interim cardiopulmonary complaints with acceptable functional status. We will defer additional diagnostic or therapeutic intervention from a cardiac perspective at this time other than to initiate Multaq 400 mg bid and obtain EKG in 5 days and consider outpatient ECV.       Diagnosis Plan   1. Cerebrovascular accident (CVA) due to embolism of left middle cerebral artery (CMS/HCC)  No significant residual neurologic deficit with continued underlying mild cognitive dysfunction   2. Typical atrial flutter (CMS/HCC)  Persistent; see below   3. Essential hypertension  Labile readings but overall acceptable control   4. Type 2 diabetes mellitus with other neurologic complication, without long-term current use of insulin (CMS/HCC)  No new data to review; Continue current treatment.    5. CKD (chronic kidney disease) stage 3, GFR 30-59 ml/min (CMS/HCC)  No new data to review; Continue  current treatment.           Plan:         1. Patient to continue current medications and close follow up with the above providers with the following changes:   A. Discontinue aspirin.   B. Initiate Multaq 400 mg bid   C. Repeat EKG in 5 days with consideration of outpatient ECV  2. Tentative cardiology follow up in April 2020, or patient may return sooner PRN.    Transcribed by Maureen Mares for Dr. Mahesh Peterson at 10:36 AM on 10/25/2019    I, Mahesh Peterson MD, FACC, personally performed the services described in this documentation as scribed by the above named individual in my presence, and it is both accurate and complete. At 10:41 AM on 10/25/2019

## 2019-10-28 ENCOUNTER — TELEPHONE (OUTPATIENT)
Dept: CARDIOLOGY | Facility: CLINIC | Age: 84
End: 2019-10-28

## 2019-10-28 NOTE — TELEPHONE ENCOUNTER
Pt's daughter called with questions about EKG and Eliquis Samples.    Pt's daughter states they picked up Multaq samples and patient has started the medication. Pt is going to go to [facility] office on Friday 11/1/19 to have EKG.    Pt's daughter request Eliquis samples, due to high cost of Eliquis.  Samples will be sent to [facility] for  on 11/1/19.

## 2019-11-01 ENCOUNTER — TELEPHONE (OUTPATIENT)
Dept: CARDIOLOGY | Facility: HOSPITAL | Age: 84
End: 2019-11-01

## 2019-11-01 ENCOUNTER — HOSPITAL ENCOUNTER (OUTPATIENT)
Dept: CARDIOLOGY | Facility: HOSPITAL | Age: 84
Discharge: HOME | End: 2019-11-01
Admitting: INTERNAL MEDICINE

## 2019-11-01 DIAGNOSIS — I48.92 ATRIAL FLUTTER, UNSPECIFIED TYPE (HCC): ICD-10-CM

## 2019-11-01 DIAGNOSIS — I48.92 ATRIAL FLUTTER, UNSPECIFIED TYPE (HCC): Primary | ICD-10-CM

## 2019-11-01 PROCEDURE — 93010 ELECTROCARDIOGRAM REPORT: CPT | Performed by: INTERNAL MEDICINE

## 2019-11-01 PROCEDURE — 93005 ELECTROCARDIOGRAM TRACING: CPT | Performed by: INTERNAL MEDICINE

## 2019-11-04 ENCOUNTER — TELEPHONE (OUTPATIENT)
Dept: CARDIOLOGY | Facility: CLINIC | Age: 84
End: 2019-11-04

## 2019-11-04 DIAGNOSIS — I48.3 TYPICAL ATRIAL FLUTTER (HCC): Primary | ICD-10-CM

## 2019-11-04 NOTE — TELEPHONE ENCOUNTER
Needs outpatient ECV as noted in my note dated 25 October 2019 either tomorrow or in 1 week.      Thanks!

## 2019-11-04 NOTE — TELEPHONE ENCOUNTER
Pt's daughter called regarding pt's EKG that was done on 11/1/19.    Pt's daughter also states that pt is having intermittent weakness in his legs. Denies swelling, SOB, chest pain, general fatigue, pain, tingling, numbness.  Advised pt's daughter to follow up with PCP regarding leg weakness.    Pt's daughter requests that RX for Multaq be sent to [facility].     Pt taking all meds listed in Epic as prescribed    Did you see EKG from 11/1/19 that was done in [place] office?    Any changes?    Please advise

## 2019-11-04 NOTE — TELEPHONE ENCOUNTER
Called pt's daughter and gave [facility] recommendations above.     Pt's daughter verbalizes understanding and agreeable to plan.

## 2019-11-05 ENCOUNTER — TELEPHONE (OUTPATIENT)
Dept: CARDIOLOGY | Facility: HOSPITAL | Age: 84
End: 2019-11-05

## 2019-11-05 ENCOUNTER — HOSPITAL ENCOUNTER (OUTPATIENT)
Dept: CARDIOLOGY | Facility: HOSPITAL | Age: 84
End: 2019-11-05

## 2019-11-05 ENCOUNTER — PREP FOR SURGERY (OUTPATIENT)
Dept: OTHER | Facility: HOSPITAL | Age: 84
End: 2019-11-05

## 2019-11-05 DIAGNOSIS — I48.0 PAF (PAROXYSMAL ATRIAL FIBRILLATION) (HCC): Primary | ICD-10-CM

## 2019-11-05 RX ORDER — SODIUM CHLORIDE 0.9 % (FLUSH) 0.9 %
3 SYRINGE (ML) INJECTION EVERY 12 HOURS SCHEDULED
Status: CANCELLED | OUTPATIENT
Start: 2019-11-05

## 2019-11-05 RX ORDER — ONDANSETRON 2 MG/ML
4 INJECTION INTRAMUSCULAR; INTRAVENOUS EVERY 6 HOURS PRN
Status: CANCELLED | OUTPATIENT
Start: 2019-11-05

## 2019-11-05 RX ORDER — LIDOCAINE HYDROCHLORIDE 10 MG/ML
0.1 INJECTION, SOLUTION EPIDURAL; INFILTRATION; INTRACAUDAL; PERINEURAL ONCE AS NEEDED
Status: CANCELLED | OUTPATIENT
Start: 2019-11-05

## 2019-11-05 RX ORDER — SODIUM CHLORIDE 0.9 % (FLUSH) 0.9 %
10 SYRINGE (ML) INJECTION AS NEEDED
Status: CANCELLED | OUTPATIENT
Start: 2019-11-05

## 2019-11-05 RX ORDER — NITROGLYCERIN 0.4 MG/1
0.4 TABLET SUBLINGUAL
Status: CANCELLED | OUTPATIENT
Start: 2019-11-05

## 2019-11-05 NOTE — TELEPHONE ENCOUNTER
Mr. Torres and his daughter came into the office in [place] today thinking he had an echo scheduled in this location.      Upon review of his chart, he was actually scheduled at [facility] in [facility] for an ECV.      I informed his daughter of being in the wrong location. She stated no one had  instructed her to be in [place].  She stated the appointment would need to be rescheduled.      I instructed her to call the main office in [place] to have this rescheduled.  She verbalized understanding.

## 2019-11-05 NOTE — TELEPHONE ENCOUNTER
I talked with PT's daughter [place], who states she is PT's POA- she states that he does not want to have ECV performed-     He is still taking Multaq- does he need to continue that if he still out of rhythm and not willing to have EVC performed?

## 2019-11-14 ENCOUNTER — APPOINTMENT (OUTPATIENT)
Dept: GENERAL RADIOLOGY | Facility: HOSPITAL | Age: 84
End: 2019-11-14

## 2019-11-14 ENCOUNTER — APPOINTMENT (OUTPATIENT)
Dept: CT IMAGING | Facility: HOSPITAL | Age: 84
End: 2019-11-14

## 2019-11-14 ENCOUNTER — HOSPITAL ENCOUNTER (OUTPATIENT)
Facility: HOSPITAL | Age: 84
Setting detail: OBSERVATION
Discharge: HOME | End: 2019-11-16
Attending: EMERGENCY MEDICINE | Admitting: EMERGENCY MEDICINE

## 2019-11-14 ENCOUNTER — TELEPHONE (OUTPATIENT)
Dept: CARDIOLOGY | Facility: CLINIC | Age: 84
End: 2019-11-14

## 2019-11-14 DIAGNOSIS — Z78.9 IMPAIRED MOBILITY AND ADLS: ICD-10-CM

## 2019-11-14 DIAGNOSIS — R47.01 EXPRESSIVE APHASIA: Primary | ICD-10-CM

## 2019-11-14 DIAGNOSIS — R53.1 ACUTE RIGHT-SIDED WEAKNESS: ICD-10-CM

## 2019-11-14 DIAGNOSIS — Z74.09 IMPAIRED MOBILITY AND ADLS: ICD-10-CM

## 2019-11-14 PROBLEM — I48.91 A-FIB (HCC): Status: ACTIVE | Noted: 2019-11-14

## 2019-11-14 PROBLEM — R09.89 SUSPECTED CEREBROVASCULAR ACCIDENT (CVA): Status: ACTIVE | Noted: 2019-11-14

## 2019-11-14 LAB
ALT SERPL W P-5'-P-CCNC: 17 U/L (ref 1–41)
APTT PPP: 40.4 SECONDS (ref 24–37)
AST SERPL-CCNC: 24 U/L (ref 1–40)
BASOPHILS # BLD AUTO: 0.02 10*3/MM3 (ref 0–0.2)
BASOPHILS NFR BLD AUTO: 0.3 % (ref 0–1.5)
BUN BLDA-MCNC: 30 MG/DL (ref 8–26)
CA-I BLDA-SCNC: 1.15 MMOL/L (ref 1.2–1.32)
CHLORIDE BLDA-SCNC: 97 MMOL/L (ref 98–109)
CO2 BLDA-SCNC: 24 MMOL/L (ref 24–29)
CREAT BLDA-MCNC: 1.8 MG/DL (ref 0.6–1.3)
DEPRECATED RDW RBC AUTO: 51.7 FL (ref 37–54)
EOSINOPHIL # BLD AUTO: 0.07 10*3/MM3 (ref 0–0.4)
EOSINOPHIL NFR BLD AUTO: 1 % (ref 0.3–6.2)
ERYTHROCYTE [DISTWIDTH] IN BLOOD BY AUTOMATED COUNT: 14.3 % (ref 12.3–15.4)
GLUCOSE BLDC GLUCOMTR-MCNC: 122 MG/DL (ref 70–130)
GLUCOSE BLDC GLUCOMTR-MCNC: 42 MG/DL (ref 70–130)
GLUCOSE BLDC GLUCOMTR-MCNC: 43 MG/DL (ref 70–130)
GLUCOSE BLDC GLUCOMTR-MCNC: 61 MG/DL (ref 70–130)
GLUCOSE BLDC GLUCOMTR-MCNC: 89 MG/DL (ref 70–130)
GLUCOSE BLDC GLUCOMTR-MCNC: 91 MG/DL (ref 70–130)
HCT VFR BLD AUTO: 36.6 % (ref 37.5–51)
HCT VFR BLDA CALC: 38 % (ref 38–51)
HGB BLD-MCNC: 11.7 G/DL (ref 13–17.7)
HGB BLDA-MCNC: 12.9 G/DL (ref 12–17)
HOLD SPECIMEN: NORMAL
HOLD SPECIMEN: NORMAL
IMM GRANULOCYTES # BLD AUTO: 0.03 10*3/MM3 (ref 0–0.05)
IMM GRANULOCYTES NFR BLD AUTO: 0.4 % (ref 0–0.5)
INR PPP: 1.6 (ref 0.8–1.2)
LYMPHOCYTES # BLD AUTO: 1.33 10*3/MM3 (ref 0.7–3.1)
LYMPHOCYTES NFR BLD AUTO: 18.2 % (ref 19.6–45.3)
MCH RBC QN AUTO: 31.4 PG (ref 26.6–33)
MCHC RBC AUTO-ENTMCNC: 32 G/DL (ref 31.5–35.7)
MCV RBC AUTO: 98.1 FL (ref 79–97)
MONOCYTES # BLD AUTO: 0.57 10*3/MM3 (ref 0.1–0.9)
MONOCYTES NFR BLD AUTO: 7.8 % (ref 5–12)
NEUTROPHILS # BLD AUTO: 5.3 10*3/MM3 (ref 1.7–7)
NEUTROPHILS NFR BLD AUTO: 72.3 % (ref 42.7–76)
NRBC BLD AUTO-RTO: 0 /100 WBC (ref 0–0.2)
PLATELET # BLD AUTO: 242 10*3/MM3 (ref 140–450)
PMV BLD AUTO: 10 FL (ref 6–12)
POTASSIUM BLDA-SCNC: 4.3 MMOL/L (ref 3.5–4.9)
PROTHROMBIN TIME: 18.5 SECONDS (ref 12.8–15.2)
RBC # BLD AUTO: 3.73 10*6/MM3 (ref 4.14–5.8)
SODIUM BLDA-SCNC: 134 MMOL/L (ref 138–146)
TROPONIN T SERPL-MCNC: <0.01 NG/ML (ref 0–0.03)
WBC NRBC COR # BLD: 7.32 10*3/MM3 (ref 3.4–10.8)
WHOLE BLOOD HOLD SPECIMEN: NORMAL
WHOLE BLOOD HOLD SPECIMEN: NORMAL

## 2019-11-14 PROCEDURE — A9270 NON-COVERED ITEM OR SERVICE: HCPCS | Performed by: NURSE PRACTITIONER

## 2019-11-14 PROCEDURE — 85014 HEMATOCRIT: CPT

## 2019-11-14 PROCEDURE — 96361 HYDRATE IV INFUSION ADD-ON: CPT

## 2019-11-14 PROCEDURE — 82962 GLUCOSE BLOOD TEST: CPT

## 2019-11-14 PROCEDURE — 0 IOPAMIDOL PER 1 ML: Performed by: EMERGENCY MEDICINE

## 2019-11-14 PROCEDURE — 93005 ELECTROCARDIOGRAM TRACING: CPT | Performed by: EMERGENCY MEDICINE

## 2019-11-14 PROCEDURE — 70450 CT HEAD/BRAIN W/O DYE: CPT

## 2019-11-14 PROCEDURE — 99220 PR INITIAL OBSERVATION CARE/DAY 70 MINUTES: CPT | Performed by: INTERNAL MEDICINE

## 2019-11-14 PROCEDURE — G0378 HOSPITAL OBSERVATION PER HR: HCPCS

## 2019-11-14 PROCEDURE — 80047 BASIC METABLC PNL IONIZED CA: CPT

## 2019-11-14 PROCEDURE — 99285 EMERGENCY DEPT VISIT HI MDM: CPT

## 2019-11-14 PROCEDURE — 63710000001 APIXABAN 2.5 MG TABLET: Performed by: NURSE PRACTITIONER

## 2019-11-14 PROCEDURE — 0042T HC CT CEREBRAL PERFUSION W/WO CONTRAST: CPT

## 2019-11-14 PROCEDURE — 96360 HYDRATION IV INFUSION INIT: CPT

## 2019-11-14 PROCEDURE — 85610 PROTHROMBIN TIME: CPT

## 2019-11-14 PROCEDURE — 63710000001 ATORVASTATIN 40 MG TABLET: Performed by: NURSE PRACTITIONER

## 2019-11-14 PROCEDURE — 84450 TRANSFERASE (AST) (SGOT): CPT | Performed by: EMERGENCY MEDICINE

## 2019-11-14 PROCEDURE — 85730 THROMBOPLASTIN TIME PARTIAL: CPT | Performed by: EMERGENCY MEDICINE

## 2019-11-14 PROCEDURE — 63710000001 DONEPEZIL 5 MG TABLET: Performed by: NURSE PRACTITIONER

## 2019-11-14 PROCEDURE — 71045 X-RAY EXAM CHEST 1 VIEW: CPT

## 2019-11-14 PROCEDURE — 85025 COMPLETE CBC W/AUTO DIFF WBC: CPT | Performed by: EMERGENCY MEDICINE

## 2019-11-14 PROCEDURE — 63710000001 INSULIN LISPRO (HUMAN) PER 5 UNITS: Performed by: NURSE PRACTITIONER

## 2019-11-14 PROCEDURE — 84460 ALANINE AMINO (ALT) (SGPT): CPT | Performed by: EMERGENCY MEDICINE

## 2019-11-14 PROCEDURE — 70498 CT ANGIOGRAPHY NECK: CPT

## 2019-11-14 PROCEDURE — 70496 CT ANGIOGRAPHY HEAD: CPT

## 2019-11-14 PROCEDURE — 84484 ASSAY OF TROPONIN QUANT: CPT | Performed by: EMERGENCY MEDICINE

## 2019-11-14 RX ORDER — SODIUM CHLORIDE 0.9 % (FLUSH) 0.9 %
10 SYRINGE (ML) INJECTION AS NEEDED
Status: DISCONTINUED | OUTPATIENT
Start: 2019-11-14 | End: 2019-11-16 | Stop reason: HOSPADM

## 2019-11-14 RX ORDER — ATORVASTATIN CALCIUM 40 MG/1
80 TABLET, FILM COATED ORAL NIGHTLY
Status: DISCONTINUED | OUTPATIENT
Start: 2019-11-14 | End: 2019-11-16 | Stop reason: HOSPADM

## 2019-11-14 RX ORDER — DONEPEZIL HYDROCHLORIDE 5 MG/1
5 TABLET, FILM COATED ORAL NIGHTLY
Status: DISCONTINUED | OUTPATIENT
Start: 2019-11-14 | End: 2019-11-15

## 2019-11-14 RX ORDER — ACETAMINOPHEN 325 MG/1
650 TABLET ORAL EVERY 4 HOURS PRN
Status: DISCONTINUED | OUTPATIENT
Start: 2019-11-14 | End: 2019-11-16 | Stop reason: HOSPADM

## 2019-11-14 RX ORDER — DEXTROSE MONOHYDRATE 25 G/50ML
25 INJECTION, SOLUTION INTRAVENOUS
Status: DISCONTINUED | OUTPATIENT
Start: 2019-11-14 | End: 2019-11-16 | Stop reason: HOSPADM

## 2019-11-14 RX ORDER — ACETAMINOPHEN 650 MG/1
650 SUPPOSITORY RECTAL EVERY 4 HOURS PRN
Status: DISCONTINUED | OUTPATIENT
Start: 2019-11-14 | End: 2019-11-16 | Stop reason: HOSPADM

## 2019-11-14 RX ORDER — LISINOPRIL 10 MG/1
10 TABLET ORAL DAILY
Status: DISCONTINUED | OUTPATIENT
Start: 2019-11-15 | End: 2019-11-15

## 2019-11-14 RX ORDER — SODIUM CHLORIDE 9 MG/ML
100 INJECTION, SOLUTION INTRAVENOUS CONTINUOUS
Status: DISCONTINUED | OUTPATIENT
Start: 2019-11-14 | End: 2019-11-15

## 2019-11-14 RX ORDER — NICOTINE POLACRILEX 4 MG
15 LOZENGE BUCCAL
Status: DISCONTINUED | OUTPATIENT
Start: 2019-11-14 | End: 2019-11-16 | Stop reason: HOSPADM

## 2019-11-14 RX ORDER — SODIUM CHLORIDE 0.9 % (FLUSH) 0.9 %
10 SYRINGE (ML) INJECTION EVERY 12 HOURS SCHEDULED
Status: DISCONTINUED | OUTPATIENT
Start: 2019-11-14 | End: 2019-11-16 | Stop reason: HOSPADM

## 2019-11-14 RX ADMIN — IOPAMIDOL 75 ML: 755 INJECTION, SOLUTION INTRAVENOUS at 18:14

## 2019-11-14 RX ADMIN — IOPAMIDOL 40 ML: 755 INJECTION, SOLUTION INTRAVENOUS at 17:48

## 2019-11-14 RX ADMIN — ATORVASTATIN CALCIUM 80 MG: 40 TABLET, FILM COATED ORAL at 22:34

## 2019-11-14 RX ADMIN — SODIUM CHLORIDE 1000 ML: 9 INJECTION, SOLUTION INTRAVENOUS at 19:15

## 2019-11-14 RX ADMIN — APIXABAN 2.5 MG: 2.5 TABLET, FILM COATED ORAL at 22:34

## 2019-11-14 RX ADMIN — DONEPEZIL HYDROCHLORIDE 5 MG: 5 TABLET, FILM COATED ORAL at 22:34

## 2019-11-14 RX ADMIN — SODIUM CHLORIDE 100 ML/HR: 9 INJECTION, SOLUTION INTRAVENOUS at 22:35

## 2019-11-14 NOTE — ED PROVIDER NOTES
"Subjective   Adriano Torres is a 88 y.o. male who presents to the ED with complaints of stroke symptoms. His daughter reports that he began experiencing slurred speech and ride side weakness which are similar symptoms to a past \"mini stroke\" that he had prompting the visit to the ED. The last known well is roughly 0900 this morning. The patient denies experiencing any pain and agrees with the symptoms that his daughter reported. There are no other acute complaints at this time.        History provided by:  Relative and patient  Neurologic Problem   The patient's primary symptoms include slurred speech and weakness. This is a new problem. The current episode started today. The neurological problem developed suddenly. The last time the patient was known to be well was 11/14/2019 9:00 AM.  The problem is unchanged. There was right-sided focality noted. Pertinent negatives include no back pain or chest pain. Past treatments include nothing.       Review of Systems   Cardiovascular: Negative for chest pain.   Musculoskeletal: Negative for back pain.   Neurological: Positive for speech difficulty and weakness.   All other systems reviewed and are negative.      Past Medical History:   Diagnosis Date   • Bilateral renal cysts    • CKD (chronic kidney disease) stage 3, GFR 30-59 ml/min (CMS/[facility])    • COPD (chronic obstructive pulmonary disease) (CMS/[facility])    • DJD (degenerative joint disease), lumbar    • DM2 (diabetes mellitus, type 2) (CMS/[facility])    • Generalized osteoarthritis    • HLD (hyperlipidemia)    • HTN (hypertension)    • Kidney stones    • PAF (paroxysmal atrial fibrillation) (CMS/[facility])    • Vascular dementia (CMS/[facility])        Allergies   Allergen Reactions   • Pollen Extract Unknown (See Comments)     rhinitis       Past Surgical History:   Procedure Laterality Date   • COLONOSCOPY         Family History   Problem Relation Age of Onset   • Heart attack Mother    • Heart attack Father        Social History "     Socioeconomic History   • Marital status:      Spouse name: N/A   • Number of children: 6   • Years of education: Electrical training   • Highest education level: Associate degree: occupational, technical, or vocational program   Occupational History   • Occupation:      Employer: RETIRED   Tobacco Use   • Smoking status: Former Smoker     Packs/day: 1.00     Years: 15.00     Pack years: 15.00     Types: Cigarettes     Last attempt to quit: 10/25/1996     Years since quittin.0   • Smokeless tobacco: Never Used   Substance and Sexual Activity   • Alcohol use: No     Frequency: Never   • Drug use: No   • Sexual activity: No     Partners: Female         Objective   Physical Exam   Constitutional: He appears well-developed and well-nourished. No distress.   HENT:   Head: Normocephalic and atraumatic.   Nose: Nose normal.   Eyes: Conjunctivae and EOM are normal. No scleral icterus.   EOMI   Neck: Normal range of motion. Neck supple.   Cardiovascular: Normal rate, regular rhythm, normal heart sounds and intact distal pulses.   No murmur heard.  Pulmonary/Chest: Effort normal and breath sounds normal. No respiratory distress.   Abdominal: Soft. There is no tenderness. There is no rebound and no guarding.   Musculoskeletal: Normal range of motion. He exhibits no edema.   Neurological: He is alert. No sensory deficit.   Difficulty communicating, baseline unknown. Possible expressive aphasia. Slurred speech. Less pronounced right nasolabial fold on the signifying right sided motor deficits in the face. Mild drift with the right lower extremity. No drift with right upper extremity. Sensation symmetric.   Skin: Skin is warm and dry.   Psychiatric: He has a normal mood and affect. His behavior is normal.   Nursing note and vitals reviewed.      Procedures         ED Course  ED Course as of Nov 15 023   Thu 2019   1847 I spoke to Dr. Harry and there is no large vessel occlusion and no plan for  acute intervention indicated.-CP  [AA]   1850 I spoke to Dr. Muhammad who agrees to continue giving fluids. The patient is already on Elequis and baby Asprin so he recommends that no further anticoagulation is needed. -CP  [AA]      ED Course User Index  [AA] Ethan Kyle      Recent Results (from the past 24 hour(s))   POC CHEM 8    Collection Time: 11/14/19  5:42 PM   Result Value Ref Range    Glucose 89 70 - 130 mg/dL    BUN 30 (H) 8 - 26 mg/dL    Creatinine 1.80 (H) 0.60 - 1.30 mg/dL    Sodium 134 (L) 138 - 146 mmol/L    Potassium 4.3 3.5 - 4.9 mmol/L    Chloride 97 (L) 98 - 109 mmol/L    Total CO2 24 24 - 29 mmol/L    Hemoglobin 12.9 12.0 - 17.0 g/dL    Hematocrit 38 38 - 51 %    Ionized Calcium 1.15 (L) 1.20 - 1.32 mmol/L   POC Protime / INR    Collection Time: 11/14/19  5:42 PM   Result Value Ref Range    Protime 18.5 (H) 12.8 - 15.2 seconds    INR 1.6 (H) 0.8 - 1.2   aPTT    Collection Time: 11/14/19  5:45 PM   Result Value Ref Range    PTT 40.4 (H) 24.0 - 37.0 seconds   Light Blue Top    Collection Time: 11/14/19  5:45 PM   Result Value Ref Range    Extra Tube hold for add-on    Gold Top - SST    Collection Time: 11/14/19  5:45 PM   Result Value Ref Range    Extra Tube Hold for add-ons.    Troponin    Collection Time: 11/14/19  5:46 PM   Result Value Ref Range    Troponin T <0.010 0.000 - 0.030 ng/mL   AST    Collection Time: 11/14/19  5:46 PM   Result Value Ref Range    AST (SGOT) 24 1 - 40 U/L   ALT    Collection Time: 11/14/19  5:46 PM   Result Value Ref Range    ALT (SGPT) 17 1 - 41 U/L   Green Top (Gel)    Collection Time: 11/14/19  5:46 PM   Result Value Ref Range    Extra Tube Hold for add-ons.    Lavender Top    Collection Time: 11/14/19  5:46 PM   Result Value Ref Range    Extra Tube hold for add-on    CBC Auto Differential    Collection Time: 11/14/19  5:46 PM   Result Value Ref Range    WBC 7.32 3.40 - 10.80 10*3/mm3    RBC 3.73 (L) 4.14 - 5.80 10*6/mm3    Hemoglobin 11.7 (L) 13.0 - 17.7 g/dL     Hematocrit 36.6 (L) 37.5 - 51.0 %    MCV 98.1 (H) 79.0 - 97.0 fL    MCH 31.4 26.6 - 33.0 pg    MCHC 32.0 31.5 - 35.7 g/dL    RDW 14.3 12.3 - 15.4 %    RDW-SD 51.7 37.0 - 54.0 fl    MPV 10.0 6.0 - 12.0 fL    Platelets 242 140 - 450 10*3/mm3    Neutrophil % 72.3 42.7 - 76.0 %    Lymphocyte % 18.2 (L) 19.6 - 45.3 %    Monocyte % 7.8 5.0 - 12.0 %    Eosinophil % 1.0 0.3 - 6.2 %    Basophil % 0.3 0.0 - 1.5 %    Immature Grans % 0.4 0.0 - 0.5 %    Neutrophils, Absolute 5.30 1.70 - 7.00 10*3/mm3    Lymphocytes, Absolute 1.33 0.70 - 3.10 10*3/mm3    Monocytes, Absolute 0.57 0.10 - 0.90 10*3/mm3    Eosinophils, Absolute 0.07 0.00 - 0.40 10*3/mm3    Basophils, Absolute 0.02 0.00 - 0.20 10*3/mm3    Immature Grans, Absolute 0.03 0.00 - 0.05 10*3/mm3    nRBC 0.0 0.0 - 0.2 /100 WBC   POC Glucose Once    Collection Time: 11/14/19  8:48 PM   Result Value Ref Range    Glucose 43 (C) 70 - 130 mg/dL   POC Glucose Once    Collection Time: 11/14/19  8:51 PM   Result Value Ref Range    Glucose 42 (C) 70 - 130 mg/dL   POC Glucose Once    Collection Time: 11/14/19  9:24 PM   Result Value Ref Range    Glucose 61 (L) 70 - 130 mg/dL   POC Glucose Once    Collection Time: 11/14/19 10:15 PM   Result Value Ref Range    Glucose 91 70 - 130 mg/dL   POC Glucose Once    Collection Time: 11/14/19 11:20 PM   Result Value Ref Range    Glucose 122 70 - 130 mg/dL   POC Glucose Once    Collection Time: 11/15/19 12:15 AM   Result Value Ref Range    Glucose 160 (H) 70 - 130 mg/dL   POC Glucose Once    Collection Time: 11/15/19  1:37 AM   Result Value Ref Range    Glucose 134 (H) 70 - 130 mg/dL     Note: In addition to lab results from this visit, the labs listed above may include labs taken at another facility or during a different encounter within the last 24 hours. Please correlate lab times with ED admission and discharge times for further clarification of the services performed during this visit.    CT Angiogram Head   Preliminary Result   Occluded  left ICA in the neck, reconstituted at the level of   the left supraclinoid ICA. No evidence of significant intracranial   vascular stenosis is seen elsewhere.       DICTATED:   11/14/2019   EDITED/ls :   11/14/2019           CT Angiogram Neck   Preliminary Result   1. Occluded left ICA just beyond its origin.   2. 50-70% right ICA origin stenosis.       DICTATED:   11/14/2019   EDITED/ls :   11/14/2019               CT Cerebral Perfusion With & Without Contrast   Preliminary Result   Slow flow throughout the left MCA territory and moderately   extensive ischemia. No evidence of significant core infarct.       DICTATED:   11/14/2019   EDITED/ls :   11/14/2019           XR Chest 1 View   Preliminary Result   Stable exam with diffuse interstitial disease, densest in   the left lung base. No clearly new or progressive chest pathology is   seen.       DICTATED:   11/14/2019   EDITED/ls :   11/14/2019               CT Head Without Contrast Stroke Protocol   Final Result   Advanced generalized cerebral atrophy expected for age. No   evidence of acute intracranial disease.               Note: Exam time is shown as 5:32 PM. Study was dictated and signed at   5:56 PM.           This report was finalized on 11/14/2019 5:56 PM by DR. Joni Mitchell MD.            Vitals:    11/14/19 1900 11/14/19 1926 11/14/19 1930 11/14/19 2322   BP: 102/66  119/92 116/64   BP Location:    Right arm   Patient Position:    Lying   Pulse:  107  103   Resp:    18   Temp:    98.1 °F (36.7 °C)   TempSrc:    Oral   SpO2: 93% 94% 92% 94%   Weight:       Height:         Medications   sodium chloride 0.9 % flush 10 mL (not administered)   atorvastatin (LIPITOR) tablet 80 mg (80 mg Oral Given 11/14/19 2234)   donepezil (ARICEPT) tablet 5 mg (5 mg Oral Given 11/14/19 2234)   lisinopril (PRINIVIL,ZESTRIL) tablet 10 mg (not administered)   metoprolol tartrate (LOPRESSOR) half tablet 12.5 mg (not administered)   dextrose (GLUTOSE) oral gel 15 g (not  administered)   dextrose (D50W) 25 g/ 50mL Intravenous Solution 25 g (not administered)   glucagon (human recombinant) (GLUCAGEN DIAGNOSTIC) injection 1 mg (not administered)   sodium chloride 0.9 % flush 10 mL ( Intravenous Canceled Entry 11/14/19 2235)   sodium chloride 0.9 % flush 10 mL (not administered)   sodium chloride 0.9 % infusion (100 mL/hr Intravenous Currently Infusing 11/15/19 0048)   insulin lispro (humaLOG) injection 0-7 Units (0 Units Subcutaneous Not Given 11/14/19 2116)   acetaminophen (TYLENOL) tablet 650 mg (not administered)     Or   acetaminophen (TYLENOL) suppository 650 mg (not administered)   apixaban (ELIQUIS) tablet 2.5 mg (2.5 mg Oral Given 11/14/19 2234)   iopamidol (ISOVUE-370) 76 % injection 100 mL (40 mL Intravenous Given 11/14/19 1748)   iopamidol (ISOVUE-370) 76 % injection 100 mL (75 mL Intravenous Given 11/14/19 1814)   sodium chloride 0.9 % bolus 1,000 mL (0 mL Intravenous Stopped 11/14/19 2115)     ECG/EMG Results (last 24 hours)     Procedure Component Value Units Date/Time    ECG 12 Lead [[id number]] Collected:  11/14/19 1757     Updated:  11/14/19 1801        ECG 12 Lead         ECG 12 Lead    (Results Pending)       Patient is not a candidate for TPA due to onset time greater than 4.5 hours.  Dr. Harry reviewed the perfusion and noted the abnormality.  CTAs were performed of the head and neck which showed the above noted pathology.  Dr. Harry states that there is no large vessel occlusion to intervene upon right is likely chronic disease and stenosis.  We will give the patient fluid bolus and admit to the hospitalist.  I discussed the case with Dr. Pruitt, neurology, also.                  MDM    Final diagnoses:   Expressive aphasia   Acute right-sided weakness       Documentation assistance provided by linda Kyle.  Information recorded by the scribe was done at my direction and has been verified and validated by me.     Ethan Kyle  11/14/19 0460        Ernie Mcelroy,   11/15/19 0236

## 2019-11-14 NOTE — TELEPHONE ENCOUNTER
Pt's daughter called, LVM, stating that pt's LE weakness had increased and that the pt's appetite had decreased.    Returned pt's daughter's call.  The pt's daughter had called EMS when the patient became less responsive. The pt's FSBS was 39 and they will be taking him to [facility].

## 2019-11-14 NOTE — PROGRESS NOTES
CTA and CTP reviewed. 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 His proximal MCA and ACAs are open, therefore he is not a candidate for intervention.

## 2019-11-15 LAB
ANION GAP SERPL CALCULATED.3IONS-SCNC: 11 MMOL/L (ref 5–15)
BASOPHILS # BLD AUTO: 0.02 10*3/MM3 (ref 0–0.2)
BASOPHILS NFR BLD AUTO: 0.3 % (ref 0–1.5)
BUN BLD-MCNC: 22 MG/DL (ref 8–23)
BUN/CREAT SERPL: 16.9 (ref 7–25)
CALCIUM SPEC-SCNC: 8.2 MG/DL (ref 8.6–10.5)
CHLORIDE SERPL-SCNC: 103 MMOL/L (ref 98–107)
CO2 SERPL-SCNC: 22 MMOL/L (ref 22–29)
CREAT BLD-MCNC: 1.3 MG/DL (ref 0.76–1.27)
DEPRECATED RDW RBC AUTO: 51.4 FL (ref 37–54)
EOSINOPHIL # BLD AUTO: 0.09 10*3/MM3 (ref 0–0.4)
EOSINOPHIL NFR BLD AUTO: 1.4 % (ref 0.3–6.2)
ERYTHROCYTE [DISTWIDTH] IN BLOOD BY AUTOMATED COUNT: 14.4 % (ref 12.3–15.4)
GFR SERPL CREATININE-BSD FRML MDRD: 52 ML/MIN/1.73
GLUCOSE BLD-MCNC: 124 MG/DL (ref 65–99)
GLUCOSE BLDC GLUCOMTR-MCNC: 134 MG/DL (ref 70–130)
GLUCOSE BLDC GLUCOMTR-MCNC: 135 MG/DL (ref 70–130)
GLUCOSE BLDC GLUCOMTR-MCNC: 160 MG/DL (ref 70–130)
GLUCOSE BLDC GLUCOMTR-MCNC: 251 MG/DL (ref 70–130)
GLUCOSE BLDC GLUCOMTR-MCNC: 295 MG/DL (ref 70–130)
GLUCOSE BLDC GLUCOMTR-MCNC: 69 MG/DL (ref 70–130)
GLUCOSE BLDC GLUCOMTR-MCNC: 90 MG/DL (ref 70–130)
HBA1C MFR BLD: 6.2 % (ref 4.8–5.6)
HCT VFR BLD AUTO: 33.5 % (ref 37.5–51)
HGB BLD-MCNC: 10.7 G/DL (ref 13–17.7)
IMM GRANULOCYTES # BLD AUTO: 0.03 10*3/MM3 (ref 0–0.05)
IMM GRANULOCYTES NFR BLD AUTO: 0.5 % (ref 0–0.5)
LYMPHOCYTES # BLD AUTO: 1.14 10*3/MM3 (ref 0.7–3.1)
LYMPHOCYTES NFR BLD AUTO: 17.8 % (ref 19.6–45.3)
MCH RBC QN AUTO: 31.1 PG (ref 26.6–33)
MCHC RBC AUTO-ENTMCNC: 31.9 G/DL (ref 31.5–35.7)
MCV RBC AUTO: 97.4 FL (ref 79–97)
MONOCYTES # BLD AUTO: 0.59 10*3/MM3 (ref 0.1–0.9)
MONOCYTES NFR BLD AUTO: 9.2 % (ref 5–12)
NEUTROPHILS # BLD AUTO: 4.55 10*3/MM3 (ref 1.7–7)
NEUTROPHILS NFR BLD AUTO: 70.8 % (ref 42.7–76)
NRBC BLD AUTO-RTO: 0 /100 WBC (ref 0–0.2)
PLATELET # BLD AUTO: 217 10*3/MM3 (ref 140–450)
PMV BLD AUTO: 10 FL (ref 6–12)
POTASSIUM BLD-SCNC: 4 MMOL/L (ref 3.5–5.2)
RBC # BLD AUTO: 3.44 10*6/MM3 (ref 4.14–5.8)
SODIUM BLD-SCNC: 136 MMOL/L (ref 136–145)
WBC NRBC COR # BLD: 6.42 10*3/MM3 (ref 3.4–10.8)

## 2019-11-15 PROCEDURE — 92523 SPEECH SOUND LANG COMPREHEN: CPT

## 2019-11-15 PROCEDURE — A9270 NON-COVERED ITEM OR SERVICE: HCPCS | Performed by: NURSE PRACTITIONER

## 2019-11-15 PROCEDURE — 97165 OT EVAL LOW COMPLEX 30 MIN: CPT

## 2019-11-15 PROCEDURE — G0378 HOSPITAL OBSERVATION PER HR: HCPCS

## 2019-11-15 PROCEDURE — 93005 ELECTROCARDIOGRAM TRACING: CPT | Performed by: NURSE PRACTITIONER

## 2019-11-15 PROCEDURE — 83036 HEMOGLOBIN GLYCOSYLATED A1C: CPT | Performed by: INTERNAL MEDICINE

## 2019-11-15 PROCEDURE — 82962 GLUCOSE BLOOD TEST: CPT

## 2019-11-15 PROCEDURE — 85025 COMPLETE CBC W/AUTO DIFF WBC: CPT | Performed by: NURSE PRACTITIONER

## 2019-11-15 PROCEDURE — 97162 PT EVAL MOD COMPLEX 30 MIN: CPT

## 2019-11-15 PROCEDURE — 63710000001 LISINOPRIL 10 MG TABLET: Performed by: NURSE PRACTITIONER

## 2019-11-15 PROCEDURE — 63710000001 INSULIN LISPRO (HUMAN) PER 5 UNITS: Performed by: NURSE PRACTITIONER

## 2019-11-15 PROCEDURE — 99226 PR SBSQ OBSERVATION CARE/DAY 35 MINUTES: CPT | Performed by: INTERNAL MEDICINE

## 2019-11-15 PROCEDURE — 93010 ELECTROCARDIOGRAM REPORT: CPT | Performed by: INTERNAL MEDICINE

## 2019-11-15 PROCEDURE — 99215 OFFICE O/P EST HI 40 MIN: CPT | Performed by: PSYCHIATRY & NEUROLOGY

## 2019-11-15 PROCEDURE — 63710000001 APIXABAN 2.5 MG TABLET: Performed by: NURSE PRACTITIONER

## 2019-11-15 PROCEDURE — 80048 BASIC METABOLIC PNL TOTAL CA: CPT | Performed by: NURSE PRACTITIONER

## 2019-11-15 PROCEDURE — 63710000001 METOPROLOL TARTRATE 12.5 MG TABLET: Performed by: NURSE PRACTITIONER

## 2019-11-15 RX ORDER — ASPIRIN 81 MG/1
81 TABLET, CHEWABLE ORAL DAILY
Status: DISCONTINUED | OUTPATIENT
Start: 2019-11-15 | End: 2019-11-16 | Stop reason: HOSPADM

## 2019-11-15 RX ORDER — DONEPEZIL HYDROCHLORIDE 10 MG/1
10 TABLET, FILM COATED ORAL NIGHTLY
Status: DISCONTINUED | OUTPATIENT
Start: 2019-11-15 | End: 2019-11-16 | Stop reason: HOSPADM

## 2019-11-15 RX ORDER — LISINOPRIL 5 MG/1
5 TABLET ORAL DAILY
Status: DISCONTINUED | OUTPATIENT
Start: 2019-11-16 | End: 2019-11-16 | Stop reason: HOSPADM

## 2019-11-15 RX ADMIN — INSULIN LISPRO 4 UNITS: 100 INJECTION, SOLUTION INTRAVENOUS; SUBCUTANEOUS at 21:58

## 2019-11-15 RX ADMIN — METOPROLOL TARTRATE 12.5 MG: 25 TABLET, FILM COATED ORAL at 21:54

## 2019-11-15 RX ADMIN — SODIUM CHLORIDE, PRESERVATIVE FREE 10 ML: 5 INJECTION INTRAVENOUS at 09:19

## 2019-11-15 RX ADMIN — LISINOPRIL 10 MG: 10 TABLET ORAL at 09:47

## 2019-11-15 RX ADMIN — APIXABAN 2.5 MG: 2.5 TABLET, FILM COATED ORAL at 09:18

## 2019-11-15 RX ADMIN — ATORVASTATIN CALCIUM 80 MG: 40 TABLET, FILM COATED ORAL at 21:54

## 2019-11-15 RX ADMIN — METOPROLOL TARTRATE 12.5 MG: 25 TABLET, FILM COATED ORAL at 09:47

## 2019-11-15 RX ADMIN — APIXABAN 2.5 MG: 2.5 TABLET, FILM COATED ORAL at 11:26

## 2019-11-15 RX ADMIN — SODIUM CHLORIDE, PRESERVATIVE FREE 10 ML: 5 INJECTION INTRAVENOUS at 21:55

## 2019-11-15 RX ADMIN — INSULIN LISPRO 4 UNITS: 100 INJECTION, SOLUTION INTRAVENOUS; SUBCUTANEOUS at 11:26

## 2019-11-15 RX ADMIN — DONEPEZIL HYDROCHLORIDE 10 MG: 10 TABLET, FILM COATED ORAL at 21:54

## 2019-11-15 RX ADMIN — APIXABAN 5 MG: 5 TABLET, FILM COATED ORAL at 21:54

## 2019-11-15 RX ADMIN — ASPIRIN 81 MG 81 MG: 81 TABLET ORAL at 16:17

## 2019-11-15 NOTE — THERAPY EVALUATION
Patient Name: Adriano Torres  : 1931    MRN: [id number]                              Today's Date: 11/15/2019       Admit Date: 2019    Visit Dx:     ICD-10-CM ICD-9-CM   1. Expressive aphasia R47.01 784.3   2. Acute right-sided weakness R53.1 728.87   3. Impaired mobility and ADLs Z74.09 799.89     Patient Active Problem List   Diagnosis   • Cerebrovascular accident (CVA) due to embolism of left middle cerebral artery (CMS/[facility])   • Atrial flutter (CMS/HCC)   • CKD (chronic kidney disease) stage 3, GFR 30-59 ml/min (CMS/HCC)   • DM2 (diabetes mellitus, type 2) (CMS/HCC)   • HLD (hyperlipidemia)   • HTN (hypertension)   • Vascular dementia (CMS/HCC)   • Pulmonary lesion, left   • Stroke due to embolism of middle cerebral artery (CMS/HCC)   • Occlusion of left carotid artery   • A-fib (CMS/[facility])   • Suspected cerebrovascular accident (CVA)   • Expressive aphasia     Past Medical History:   Diagnosis Date   • Bilateral renal cysts    • CKD (chronic kidney disease) stage 3, GFR 30-59 ml/min (CMS/HCC)    • COPD (chronic obstructive pulmonary disease) (CMS/HCC)    • DJD (degenerative joint disease), lumbar    • DM2 (diabetes mellitus, type 2) (CMS/HCC)    • Generalized osteoarthritis    • HLD (hyperlipidemia)    • HTN (hypertension)    • Kidney stones    • PAF (paroxysmal atrial fibrillation) (CMS/HCC)    • Vascular dementia (CMS/HCC)      Past Surgical History:   Procedure Laterality Date   • COLONOSCOPY       General Information     Row Name 11/15/19 5217          PT Evaluation Time/Intention    Document Type  evaluation  -AA     Mode of Treatment  physical therapy  -AA     Row Name 11/15/19 7704          General Information    Patient Profile Reviewed?  yes  -AA     Prior Level of Function  independent:;all household mobility;community mobility;dependent:;driving  -AA     Existing Precautions/Restrictions  fall  -AA     Barriers to Rehab  cognitive status  -AA     Row Name 11/15/19 4939           Relationship/Environment    Lives With  child(guerrero), adult  -AA     Row Name 11/15/19 1059          Resource/Environmental Concerns    Current Living Arrangements  home/apartment/condo  -     Row Name 11/15/19 1059          Home Main Entrance    Number of Stairs, Main Entrance  eight  -AA     Stair Railings, Main Entrance  railings on both sides of stairs  -AA     Row Name 11/15/19 1059          Stairs Within Home, Primary    Number of Stairs, Within Home, Primary  ten  -AA     Stair Railings, Within Home, Primary  railing on left side (ascending)  -AA     Row Name 11/15/19 1059          Cognitive Assessment/Intervention- PT/OT    Orientation Status (Cognition)  oriented to;person;place  -AA     Personal Safety Interventions  fall prevention program maintained;gait belt;nonskid shoes/slippers when out of bed  -AA     Row Name 11/15/19 1059          Safety Issues, Functional Mobility    Safety Issues Affecting Function (Mobility)  awareness of need for assistance;insight into deficits/self awareness;sequencing abilities  -AA     Impairments Affecting Function (Mobility)  balance  -AA       User Key  (r) = Recorded By, (t) = Taken By, (c) = Cosigned By    Initials Name Provider Type    AA Dian Pacheco, PT Physical Therapist        Mobility     Row Name 11/15/19 1059          Bed Mobility Assessment/Treatment    Comment (Bed Mobility)  pt [facility]  -     Row Name 11/15/19 1059          Bed-Chair Transfer    Bed-Chair [place] (Transfers)  contact guard;verbal cues  -AA     Assistive Device (Bed-Chair Transfers)  other (see comments) gait belt  -     Row Name 11/15/19 1059          Sit-Stand Transfer    Sit-Stand [place] (Transfers)  contact guard  -AA     Assistive Device (Sit-Stand Transfers)  other (see comments) gait belt  -AA     Row Name 11/15/19 1059          Gait/Stairs Assessment/Training    Gait/Stairs Assessment/Training  gait/ambulation independence;distance ambulated;gait pattern  -AA      [place] Level (Gait)  contact guard;verbal cues  -AA     Assistive Device (Gait)  other (see comments) gait belt  -AA     Distance in Feet (Gait)  200  -AA     Pattern (Gait)  swing-through  -AA     Deviations/Abnormal Patterns (Gait)  bilateral deviations;stride length decreased;base of support, wide  -AA     Bilateral Gait Deviations  forward flexed posture  -AA     Comment (Gait/Stairs)  Pt ambulated with no AD and CGA with cues for safely navigating obstacles and turns.  Wide GERSON with daughters state that is normal.  Min deviated pathway  -AA       User Key  (r) = Recorded By, (t) = Taken By, (c) = Cosigned By    Initials Name Provider Type    AA Dian Pacheco, PT Physical Therapist        Obj/Interventions     Row Name 11/15/19 1059          General ROM    GENERAL ROM COMMENTS  BLE WNL  -AA     Row Name 11/15/19 1059          MMT (Manual Muscle Testing)    General MMT Comments  BLE 4+/5  -AA     Row Name 11/15/19 1059          Static Sitting Balance    Level of [place] (Unsupported Sitting, Static Balance)  conditional independence  -AA     Sitting Position (Unsupported Sitting, Static Balance)  sitting in chair  -AA     Time Able to Maintain Position (Unsupported Sitting, Static Balance)  more than 5 minutes  -AA     Row Name 11/15/19 1059          Static Standing Balance    Level of [place] (Supported Standing, Static Balance)  contact guard assist  -AA     Time Able to Maintain Position (Supported Standing, Static Balance)  4 to 5 minutes  -AA     Row Name 11/15/19 1059          Dynamic Standing Balance    Level of [place], Reaches Outside Midline (Standing, Dynamic Balance)  contact guard assist  -AA     Time Able to Maintain Position, Reaches Outside Midline (Standing, Dynamic Balance)  4 to 5 minutes  -AA     Row Name 11/15/19 1059          Sensory Assessment/Intervention    Sensory General Assessment  no sensation deficits identified  -AA       User Key  (r) = Recorded By, (t) =  Taken By, (c) = Cosigned By    Initials Name Provider Type    Dian Urias, PT Physical Therapist        Goals/Plan     Row Name 11/15/19 105          Transfer Goal 1 (PT)    Activity/Assistive Device (Transfer Goal 1, PT)  sit-to-stand/stand-to-sit;bed-to-chair/chair-to-bed  -AA     [place] Level/Cues Needed (Transfer Goal 1, PT)  independent  -AA     Time Frame (Transfer Goal 1, PT)  5 days  -AA     Row Name 11/15/19 1059          Gait Training Goal 1 (PT)    Activity/Assistive Device (Gait Training Goal 1, PT)  gait (walking locomotion)  -AA     [place] Level (Gait Training Goal 1, PT)  conditional independence  -AA     Distance (Gait Goal 1, PT)  500 feet  -AA     Time Frame (Gait Training Goal 1, PT)  5 days  -AA     Row Name 11/15/19 1059          Stairs Goal 1 (PT)    Activity/Assistive Device (Stairs Goal 1, PT)  stairs, all skills  -AA     [place] Level/Cues Needed (Stairs Goal 1, PT)  conditional independence  -AA     Number of Stairs (Stairs Goal 1, PT)  10  -AA     Time Frame (Stairs Goal 1, PT)  5 days  -AA       User Key  (r) = Recorded By, (t) = Taken By, (c) = Cosigned By    Initials Name Provider Type    Dian Urias, PT Physical Therapist        Clinical Impression     Row Name 11/15/19 1051          Pain Assessment    Additional Documentation  Pain Scale: Numbers Pre/Post-Treatment (Group)  -AA     Row Name 11/15/19 1059          Pain Scale: Numbers Pre/Post-Treatment    Pain Scale: Numbers, Pretreatment  0/10 - no pain  -AA     Pain Scale: Numbers, Post-Treatment  0/10 - no pain  -AA     Row Name 11/15/19 1057          Plan of Care Review    Plan of Care Reviewed With  patient;family  -AA     Row Name 11/15/19 1056          Physical Therapy Clinical Impression    Criteria for Skilled Interventions Met (PT Clinical Impression)  yes;treatment indicated  -AA     Rehab Potential (PT Clinical Summary)  good, to achieve stated therapy goals  -AA     Predicted Duration of  Therapy (PT)  5 days  -     Row Name 11/15/19 1059          Positioning and Restraints    Pre-Treatment Position  sitting in chair/recliner  -AA     Post Treatment Position  chair  -AA     In Chair  notified nsg;exit alarm on;with family/caregiver;sitting;call light within reach;encouraged to call for assist  -AA       User Key  (r) = Recorded By, (t) = Taken By, (c) = Cosigned By    Initials Name Provider Type    Dian Urias PT Physical Therapist        Outcome Measures     Row Name 11/15/19 1059          How much help from another person do you currently need...    Turning from your back to your side while in flat bed without using bedrails?  4  -AA     Moving from lying on back to sitting on the side of a flat bed without bedrails?  3  -AA     Moving to and from a bed to a chair (including a wheelchair)?  3  -AA     Standing up from a chair using your arms (e.g., wheelchair, bedside chair)?  3  -AA     Climbing 3-5 steps with a railing?  3  -AA     To walk in hospital room?  3  -AA     [facility] 6 Clicks Score (PT)  19  -     Row Name 11/15/19 1059          Modified [place] Scale    Pre-Stroke Modified [place] Scale  0 - No Symptoms at all.  -     Modified [place] Scale  3 - Moderate disability.  Requiring some help, but able to walk without assistance.  -     Row Name 11/15/19 1059          Functional Assessment    Outcome Measure Options  AM-PAC 6 Clicks Basic Mobility (PT);Modified Marquez  -       User Key  (r) = Recorded By, (t) = Taken By, (c) = Cosigned By    Initials Name Provider Type    Dian Urias PT Physical Therapist        Physical Therapy Education     Title: PT OT SLP Therapies (In Progress)     Topic: Physical Therapy (In Progress)     Point: Mobility training (Done)     Learning Progress Summary           Patient Acceptance, E,D, VU,NR by EMERY at 11/15/2019 10:59 AM   Family Acceptance, E,D, VU,NR by EMERY at 11/15/2019 10:59 AM                   Point: Body mechanics (Done)      Learning Progress Summary           Patient Acceptance, E,D, VU,NR by EMERY at 11/15/2019 10:59 AM   Family Acceptance, E,D, VU,NR by EMERY at 11/15/2019 10:59 AM                   Point: Precautions (Done)     Learning Progress Summary           Patient Acceptance, E,D, VU,NR by EMERY at 11/15/2019 10:59 AM   Family Acceptance, E,D, VU,NR by AA at 11/15/2019 10:59 AM                               User Key     Initials Effective Dates Name Provider Type Discipline     04/02/18 -  iDan Pacheco PT Physical Therapist PT              PT Recommendation and Plan  Planned Therapy Interventions (PT Eval): balance training, gait training, home exercise program, bed mobility training, stair training, patient/family education, strengthening, transfer training  Outcome Summary/Treatment Plan (PT)  Anticipated Discharge Disposition (PT): home with assist  Plan of Care Reviewed With: patient, family  Progress: improving  Outcome Summary: PT eval complete.  Pt presents with no pain, numbness, or sensation deficits.  Pt perform transfers CGA with no AD.  Pt ambulated 200 feet with no AD and CGA with min deviation from pathway and cues for navigating obstacles.  Pt lives with his daughters and is independent including stairs with mobility.  Pt confused but follows directions and cues well.  Pt will plan to DC home with family when appropriate     Time Calculation:   PT Charges     Row Name 11/15/19 1059             Time Calculation    Start Time  1059  -AA      PT Received On  11/15/19  -      PT Goal Re-Cert Due Date  11/20/19  -        User Key  (r) = Recorded By, (t) = Taken By, (c) = Cosigned By    Initials Name Provider Type    AA Dian Pacheco PT Physical Therapist        Therapy Charges for Today     Code Description Service Date Service Provider Modifiers Qty    [id number]  PT EVAL MOD COMPLEXITY 4 11/15/2019 Dian Pacheco, PT GP 1          PT G-Codes  Outcome Measure Options: AM-PAC 6 Clicks Basic Mobility (PT),  Modified Pierre  AM-PAC 6 Clicks Score (PT): 19  AM-PAC 6 Clicks Score (OT): 15  Modified [place] Scale: 3 - Moderate disability.  Requiring some help, but able to walk without assistance.    Dian Pacheco, PT  11/15/2019

## 2019-11-15 NOTE — PLAN OF CARE
Problem: Patient Care Overview  Goal: Plan of Care Review  Outcome: Ongoing (interventions implemented as appropriate)   11/15/19 5834   Coping/Psychosocial   Plan of Care Reviewed With patient;family   Plan of Care Review   Progress improving   OTHER   Outcome Summary PT eval complete. Pt presents with no pain, numbness, or sensation deficits. Pt perform transfers CGA with no AD. Pt ambulated 200 feet with no AD and CGA with min deviation from pathway and cues for navigating obstacles. Pt lives with his daughters and is independent including stairs with mobility. Pt confused but follows directions and cues well. Pt will plan to DC home with family when appropriate

## 2019-11-15 NOTE — PLAN OF CARE
Problem: Patient Care Overview  Goal: Plan of Care Review  Outcome: Ongoing (interventions implemented as appropriate)   11/15/19 0810   Coping/Psychosocial   Plan of Care Reviewed With patient   OTHER   Outcome Summary Pt with current evolving symptoms and PMH that has affected ADL I. Pt confused, unable to establish baseline Pt is CGA for txfrs, mod assist LB ADLs and tray set up. Continue OT.       Problem: Stroke (Ischemic) (Adult)  Goal: Signs and Symptoms of Listed Potential Problems Will be Absent, Minimized or Managed (Stroke)  Outcome: Ongoing (interventions implemented as appropriate)   11/15/19 0810   Goal/Outcome Evaluation   Problems Assessed (Stroke (Ischemic)) muscle tone abnormal;motor/sensory impairment;cognitive impairment;communication impairment;acute neurologic deterioration   Problems Assessed (Stroke (Ischemic)) motor/sensory impairment;cognitive impairment

## 2019-11-15 NOTE — PROGRESS NOTES
[facility] Medicine Services  PROGRESS NOTE    Patient Name: Adriano Torres  : 1931  MRN: [id number]    Date of Admission: 2019  Primary Care Physician: Gilbert Mercer MD    Subjective   Subjective     CC:  F/u stroke-like symptoms, hypglycemia    HPI:  Patient sitting up in bedside chair. He has no major complaints. He has returned to baseline    Review of Systems  Gen- No fevers, chills  CV- No chest pain, palpitations  Resp- No cough, dyspnea  GI- No N/V/D, abd pain          Objective   Objective     Vital Signs:   Temp:  [98.1 °F (36.7 °C)-99.8 °F (37.7 °C)] 98.9 °F (37.2 °C)  Heart Rate:  [] 79  Resp:  [18-20] 18  BP: (102-164)/(64-99) 124/99  Total (NIH Stroke Scale): 1     Physical Exam:  Constitutional: No acute distress, awake, alert, elderly male  HENT: NCAT, mucous membranes moist, hard of hearing  Respiratory: Clear to auscultation bilaterally, respiratory effort normal   Cardiovascular: RRR, no murmurs, rubs, or gallops, palpable pedal pulses bilaterally  Gastrointestinal: Positive bowel sounds, soft, nontender, nondistended  Musculoskeletal: No bilateral ankle edema  Psychiatric: Appropriate affect, cooperative  Neurologic: Oriented to person/place, strength symmetric in all extremities, Cranial Nerves grossly intact to confrontation, speech clear  Skin: No rashes      Results Reviewed:    Results from last 7 days   Lab Units 11/15/19  0625 11/14/19  1746 11/14/19  1742   WBC 10*3/mm3 6.42 7.32  --    HEMOGLOBIN g/dL 10.7* 11.7*  --    HEMOGLOBIN, POC g/dL  --   --  12.9   HEMATOCRIT % 33.5* 36.6*  --    HEMATOCRIT POC %  --   --  38   PLATELETS 10*3/mm3 217 242  --    INR   --   --  1.6*     Results from last 7 days   Lab Units 11/15/19  0625 11/14/19  1746 11/14/19  1742   SODIUM mmol/L 136  --   --    POTASSIUM mmol/L 4.0  --   --    CHLORIDE mmol/L 103  --   --    CO2 mmol/L 22.0  --   --    BUN mg/dL 22  --   --    CREATININE mg/dL 1.30*  --   1.80*   GLUCOSE mg/dL 124*  --   --    CALCIUM mg/dL 8.2*  --   --    ALT (SGPT) U/L  --  17  --    AST (SGOT) U/L  --  24  --    TROPONIN T ng/mL  --  <0.010  --      Estimated Creatinine Clearance: 37.9 mL/min (A) (by C-G formula based on SCr of 1.3 mg/dL (H)).    Microbiology Results Abnormal     None          Imaging Results (Last 24 Hours)     Procedure Component Value Units Date/Time    CT Angiogram Head [[id number]] Collected:  11/14/19 1902     Updated:  11/14/19 1916    Narrative:       EXAMINATION: CT ANGIOGRAM HEAD - 11/14/2019     INDICATION: Right-sided weakness, slurred speech.     TECHNIQUE: Pre- and postcontrast 3 mm and 0.75 mm unenhanced and  enhanced images through the brain with 2D angiographic reconstructions.     The radiation dose reduction device was turned on for each scan per the  ALARA (As Low as Reasonably Achievable) protocol.     COMPARISON: Cerebral perfusion study of same date. CTA neck of same  date.     FINDINGS: Note is again made of absent contrast in the distal left ICA,  with reconstitution of the supraclinoid ICA. Distal right ICA shows  moderate atherosclerotic disease but no evidence of high-grade stenosis.     Anterior cerebral arteries are symmetric and normal in size. Middle  cerebral arteries and proximal branches appear symmetric and normal.  Distal vertebral arteries are asymmetric with hypoplastic left vertebral  artery supplying the cerebellum and normal-caliber right vertebral  artery continuing as the basilar artery. Basilar artery is relatively  small as there are bilateral posterior communicating arteries. No  significant right or left posterior cerebral artery stenosis is seen.       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 No evidence of significant intracranial  vascular stenosis is seen elsewhere.     DICTATED:   11/14/2019  EDITED/ls :   11/14/2019        CT Angiogram Neck [[id number]] Collected:   11/14/19 1855     Updated:  11/14/19 1914    Narrative:       EXAMINATION: CT ANGIOGRAM NECK - 11/14/2019     INDICATION: Right-sided weakness, slurred speech.      TECHNIQUE: Pre- and postcontrast 3 mm and 0.75 mm axial images through  the neck with 2D angiographic reconstructions of the carotid arteries.     The radiation dose reduction device was turned on for each scan per the  ALARA (As Low as Reasonably Achievable) protocol.     COMPARISON: None     FINDINGS: There is moderate narrowing of the proximal left subclavian  artery. Innominate and right subclavian appear grossly normal.     On the right, no significant common carotid stenosis is seen up to level  of the carotid bulb. There is eccentric calcification but probably only  mild right ICA origin stenosis. Beyond its origin, very focal,  approximately 50-70% narrowing, and then the ICA returns to normal  caliber. The right ECA appears grossly normal.     Left common carotid appears grossly normal-appearing up to the  bifurcation where there is fairly heavy circumferential calcification of  the carotid bulb. There is apparent occlusion of the left ICA just  beyond its origin. Left ECA appears relatively small but normal. These  images show the distal intracranial ICA to be reconstituted. Right  vertebral artery appears normal. Left vertebral artery is small but  normal.       Impression:       1. Occluded left ICA just beyond its origin.  2. 50-70% right ICA origin stenosis.     DICTATED:   11/14/2019  EDITED/ls :   11/14/2019           CT Cerebral Perfusion With & Without Contrast [[id number]] Collected:  11/14/19 1758     Updated:  11/14/19 1914    Narrative:       EXAMINATION: CT CEREBRAL PERFUSION WWO CONTRAST - 11/14/2019     INDICATION: TIA, initial screening      TECHNIQUE: Cerebral perfusion analysis was performed using computed  tomography with contrast administration including postprocessing of  parametric maps with determination of cerebral blood  flow, cerebral  blood volume, and mean transit time.     The radiation dose reduction device was turned on for each scan per the  ALARA (As Low as Reasonably Achievable) protocol.     COMPARISON: PET/CT scan of same date     FINDINGS: There is diffusely increased mean transit time and time to  drain to the left MCA territory. There is relatively mildly decreased  cerebral blood flow mostly through the superior portion of left MCA  territory and along the insular cortex. Cerebral blood volume, however,  appears normal.       Impression:       Slow flow throughout the left MCA territory and moderately  extensive ischemia. No evidence of significant core infarct.     DICTATED:   11/14/2019  EDITED/ls :   11/14/2019        XR Chest 1 View [[id number]] Collected:  11/14/19 1810     Updated:  11/14/19 1901    Narrative:          EXAMINATION: XR CHEST 1 VW - 11/14/2019      INDICATION: Stroke protocol.     COMPARISON: 09/24/2019     FINDINGS: Patchy dense interstitial disease in the left base and diffuse  interstitial disease elsewhere appear unchanged. No lung consolidation,  effusion or pneumothorax is seen. The heart is normal in size.           Impression:       Stable exam with diffuse interstitial disease, densest in  the left lung base. No clearly new or progressive chest pathology is  seen.     DICTATED:   11/14/2019  EDITED/ls :   11/14/2019           CT Head Without Contrast Stroke Protocol [[id number]] Collected:  11/14/19 1754     Updated:  11/14/19 1759    Narrative:       EXAMINATION: CT HEAD WO CONTRAST STROKE PROTOCOL-      INDICATION: Stroke     TECHNIQUE: 5 mm unenhanced images through the brain     The radiation dose reduction device was turned on for each scan per the  ALARA (As Low as Reasonably Achievable) protocol.     COMPARISON: 09/24/2019     FINDINGS: The calvarium appears intact. Included paranasal sinuses and  mastoids appear clear. Soft tissue window images show expected  generalized cerebral  atrophy for age. There is no evidence of  intracranial hemorrhage, mass, mass effect, infarct, hydrocephalus, or  abnormal extra-axial collection.             Impression:       Advanced generalized cerebral atrophy expected for age. No  evidence of acute intracranial disease.           Note: Exam time is shown as 5:32 PM. Study was dictated and signed at  5:56 PM.        This report was finalized on 11/14/2019 5:56 PM by DR. Joni Mitchell MD.             Results for orders placed during the hospital encounter of 09/23/19   Adult Transthoracic Echo Complete W/ Cont if Necessary Per Protocol (With Agitated Saline)    Narrative · Left ventricular wall thickness is consistent with mild concentric   hypertrophy.  · Estimated EF = 58%.  · Left ventricular systolic function is normal.  · Left atrial cavity size is mildly dilated.  · Normal right ventricular cavity size, wall thickness, systolic function   and septal motion noted.  · Saline test results are negative.  · The aortic valve exhibits mild sclerosis.  · Mild MAC is present.  · No evidence of pulmonary hypertension is present.  · There is no evidence of pericardial effusion.          I have reviewed the medications:  Scheduled Meds:  apixaban 5 mg Oral Q12H   aspirin 81 mg Oral Daily   atorvastatin 80 mg Oral Nightly   donepezil 10 mg Oral Nightly   insulin lispro 0-7 Units Subcutaneous 4x Daily With Meals & Nightly   [START ON 11/16/2019] lisinopril 5 mg Oral Daily   metoprolol tartrate 12.5 mg Oral Q12H   sodium chloride 10 mL Intravenous Q12H     Continuous Infusions:   PRN Meds:.•  acetaminophen **OR** acetaminophen  •  dextrose  •  dextrose  •  glucagon (human recombinant)  •  sodium chloride  •  sodium chloride      Assessment/Plan   Assessment / Plan     Active Hospital Problems    Diagnosis  POA   • **Suspected cerebrovascular accident (CVA) [R09.89]  Yes   • A-fib (CMS/HCC) [I48.91]  Yes   • Expressive aphasia [R47.01]  Yes   • Occlusion of left carotid  artery [I65.22]  Yes   • Cerebrovascular accident (CVA) due to embolism of left middle cerebral artery (CMS/HCC) [I63.412]  Yes   • CKD (chronic kidney disease) stage 3, GFR 30-59 ml/min (CMS/[facility]) [N18.3]  Yes   • DM2 (diabetes mellitus, type 2) (CMS/HCC) [E11.9]  Yes   • HLD (hyperlipidemia) [E78.5]  Yes   • HTN (hypertension) [I10]  Yes   • Vascular dementia (CMS/[facility]) [F01.50]  Yes      Resolved Hospital Problems   No resolved problems to display.        Brief Hospital Course to date:  Adriano Torres is a 88 y.o. male to the ED with complaint of weakness and slurred speech who was found to have concern for hypo-perfusion secondary to chronic left ICA occlusion.    Hypoperfusion with stroke-like symptoms  - likely secondary to chronic left ICA occlusion and low blood pressure in setting of hypoglycemia  - Neurology following, discussed with Dr. Vicente  - did well with PT/OT  - decrease lisinopril dose, restart ASA, continue atorvastatin     Atrial fib   - Rate controlled on metoprolol  - continue Eliquis 5mg BID     Diabetes mellitus 2, complicated by hypoglycemia  - repeat A1c  - plan to discontinue Glipizide permanently   - SSI coverage  - pending A1c, would decrease Metformin to 500mg BID or stop completely     Hyperlipidemia  - Continue high-dose statin     Hypertension  - continue metoprolol, cut lisinopril to 5mg daily     Vascular dementia  - Continue Aricept     CKD 3  -at baseline, monitor     DVT prophylaxis: On Eliquis     [facility]    [facility] STATUS:   Code Status and Medical Interventions:   Ordered at: 11/14/19 2021     Code Status:    CPR     Medical Interventions (Level of Support Prior to Arrest):    Full         Electronically signed by Giselle Cassidy DO, 11/15/19, 2:42 PM.

## 2019-11-15 NOTE — PLAN OF CARE
Problem: Patient Care Overview  Goal: Plan of Care Review  Outcome: Ongoing (interventions implemented as appropriate)   11/15/19 0614   Coping/Psychosocial   Plan of Care Reviewed With patient   Plan of Care Review   Progress no change   OTHER   Outcome Summary Pt arrived from ED. VSS. Afib/aflutter, rate controlled unless getting up to urinate. RA. No reports of pain. Pt getting out of bed frequently to urinate, pt disoriented to time and situation.

## 2019-11-15 NOTE — H&P
[facility] Medicine Services  HISTORY AND PHYSICAL    Patient Name: Adriano Torres  : 1931  MRN: [id number]  Primary Care Physician: Gilbert Mercer MD  Date of admission: 2019      Subjective   Subjective     Chief Complaint:  Weakness, slurred speech     HPI:  Adriano Torres is a 88 y.o. male with PMH significant CKD, COPD, DM2, HLD, HTN, carotid artery stenosis, PAF on Eliquis, vascular dementia who presents to the ED with complaint of weakness and slurred speech.  His daughters who are at bedside help provide HPI as patient has difficulty with recall of recent history.  They state that this morning when he first woke up he was somewhat weaker than his baseline.  By lunchtime, he was having severe weakness in his lower extremities and stumbling when trying to walk.  They note that they feel his right side was weaker than his left.  Shortly after lunch he began to have slurred speech.  They called EMS who report reported to them that his FSBG was 35.  He was given dextrose IV and has since improved.  Upon arrival to the ED, he continued with some mild slurred speech which is now much better.  He continues to have right-sided weakness.  Family notes that he has right sided deficit at baseline but not to the severity.  During this exam, he continues to have global weakness with R>L.  CTA head/neck and CT perfusion reviewed per neurosurgery who notes chronic left ICA occlusion with hyper perfusion of the left ICA territory.  His proximal MCA and SWAPNIL's are open therefore he is not a candidate for intervention.  MRI is not able to be obtained secondary to metal fragment behind his eye.  He will be admitted to hospital medicine for further evaluation.    Review of Systems   Constitutional: Positive for activity change and appetite change. Negative for chills, diaphoresis, fatigue and fever.   HENT: Negative.    Respiratory: Negative for cough, chest tightness, shortness  of breath and wheezing.    Cardiovascular: Negative for chest pain, palpitations and leg swelling.   Gastrointestinal: Negative for abdominal distention, abdominal pain, constipation, diarrhea and nausea.   Genitourinary: Negative for difficulty urinating, dysuria, frequency and urgency.   Musculoskeletal: Positive for gait problem. Negative for arthralgias and myalgias.   Skin: Negative for color change, pallor and rash.   Neurological: Positive for tremors, facial asymmetry, speech difficulty and weakness. Negative for dizziness and headaches.   Psychiatric/Behavioral: Positive for confusion. The patient is not nervous/anxious.         All other systems reviewed and are negative.     Personal History     Past Medical History:   Diagnosis Date   • Bilateral renal cysts    • CKD (chronic kidney disease) stage 3, GFR 30-59 ml/min (CMS/[facility])    • COPD (chronic obstructive pulmonary disease) (CMS/[facility])    • DJD (degenerative joint disease), lumbar    • DM2 (diabetes mellitus, type 2) (CMS/[facility])    • Generalized osteoarthritis    • HLD (hyperlipidemia)    • HTN (hypertension)    • Kidney stones    • PAF (paroxysmal atrial fibrillation) (CMS/[facility])    • Vascular dementia (CMS/[facility])        Past Surgical History:   Procedure Laterality Date   • COLONOSCOPY         Family History: family history includes Heart attack in his father and mother. Otherwise pertinent FHx was reviewed and unremarkable.     Social History:  reports that he quit smoking about 23 years ago. His smoking use included cigarettes. He has a 15.00 pack-year smoking history. He has never used smokeless tobacco. He reports that he does not drink alcohol or use drugs.  Social History     Social History Narrative   • Not on file       Medications:  Available home medication information reviewed.    (Not in a hospital admission)    Allergies   Allergen Reactions   • Pollen Extract Unknown (See Comments)     rhinitis       Objective   Objective     Vital Signs:    Temp:  [99.7 °F (37.6 °C)] 99.7 °F (37.6 °C)  Heart Rate:  [] 107  Resp:  [18] 18  BP: (102-134)/(64-92) 119/92   Total (NIH Stroke Scale): 3    Physical Exam   Constitutional: He is oriented to person, place, and time. He appears well-developed and well-nourished. No distress.   HENT:   Head: Normocephalic and atraumatic.   Eyes: Pupils are equal, round, and reactive to light.   Neck: Normal range of motion. Neck supple. No JVD present.   Cardiovascular: Normal rate, normal heart sounds and intact distal pulses. An irregularly irregular rhythm present. Exam reveals no gallop and no friction rub.   No murmur heard.  Pulmonary/Chest: Effort normal and breath sounds normal. No respiratory distress. He has no wheezes. He has no rales.   Abdominal: Soft. Bowel sounds are normal. He exhibits no distension and no mass. There is no tenderness. There is no guarding.   Musculoskeletal: He exhibits no edema or tenderness.   Neurological: He is alert and oriented to person, place, and time.   Patient oriented to self and situation but very poor historian.  Global weakness with R>L and upper extremities> lower extremities   Skin: Skin is warm and dry. No rash noted. No erythema.   Psychiatric: He has a normal mood and affect. His behavior is normal.   Vitals reviewed.       Results Reviewed:  I have personally reviewed current lab and radiology data.    Results from last 7 days   Lab Units 11/14/19 1746 11/14/19  1742   WBC 10*3/mm3 7.32  --    HEMOGLOBIN g/dL 11.7*  --    HEMOGLOBIN, POC g/dL  --  12.9   HEMATOCRIT % 36.6*  --    HEMATOCRIT POC %  --  38   PLATELETS 10*3/mm3 242  --    INR   --  1.6*     Results from last 7 days   Lab Units 11/14/19 1746 11/14/19  1742   CREATININE mg/dL  --  1.80*   ALT (SGPT) U/L 17  --    AST (SGOT) U/L 24  --    TROPONIN T ng/mL <0.010  --      Estimated Creatinine Clearance: 28.2 mL/min (A) (by C-G formula based on SCr of 1.8 mg/dL (H)).  Brief Urine Lab Results     None         Imaging Results (Last 24 Hours)     Procedure Component Value Units Date/Time    CT Angiogram Head [[id number]] Collected:  11/14/19 1902     Updated:  11/14/19 1916    Narrative:       EXAMINATION: CT ANGIOGRAM HEAD - 11/14/2019     INDICATION: Right-sided weakness, slurred speech.     TECHNIQUE: Pre- and postcontrast 3 mm and 0.75 mm unenhanced and  enhanced images through the brain with 2D angiographic reconstructions.     The radiation dose reduction device was turned on for each scan per the  ALARA (As Low as Reasonably Achievable) protocol.     COMPARISON: Cerebral perfusion study of same date. CTA neck of same  date.     FINDINGS: Note is again made of absent contrast in the distal left ICA,  with reconstitution of the supraclinoid ICA. Distal right ICA shows  moderate atherosclerotic disease but no evidence of high-grade stenosis.     Anterior cerebral arteries are symmetric and normal in size. Middle  cerebral arteries and proximal branches appear symmetric and normal.  Distal vertebral arteries are asymmetric with hypoplastic left vertebral  artery supplying the cerebellum and normal-caliber right vertebral  artery continuing as the basilar artery. Basilar artery is relatively  small as there are bilateral posterior communicating arteries. No  significant right or left posterior cerebral artery stenosis is seen.       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 No evidence of significant intracranial  vascular stenosis is seen elsewhere.     DICTATED:   11/14/2019  EDITED/ls :   11/14/2019        CT Angiogram Neck [[id number]] Collected:  11/14/19 1855     Updated:  11/14/19 1914    Narrative:       EXAMINATION: CT ANGIOGRAM NECK - 11/14/2019     INDICATION: Right-sided weakness, slurred speech.      TECHNIQUE: Pre- and postcontrast 3 mm and 0.75 mm axial images through  the neck with 2D angiographic reconstructions of the carotid arteries.      The radiation dose reduction device was turned on for each scan per the  ALARA (As Low as Reasonably Achievable) protocol.     COMPARISON: None     FINDINGS: There is moderate narrowing of the proximal left subclavian  artery. Innominate and right subclavian appear grossly normal.     On the right, no significant common carotid stenosis is seen up to level  of the carotid bulb. There is eccentric calcification but probably only  mild right ICA origin stenosis. Beyond its origin, very focal,  approximately 50-70% narrowing, and then the ICA returns to normal  caliber. The right ECA appears grossly normal.     Left common carotid appears grossly normal-appearing up to the  bifurcation where there is fairly heavy circumferential calcification of  the carotid bulb. There is apparent occlusion of the left ICA just  beyond its origin. Left ECA appears relatively small but normal. These  images show the distal intracranial ICA to be reconstituted. Right  vertebral artery appears normal. Left vertebral artery is small but  normal.       Impression:       1. Occluded left ICA just beyond its origin.  2. 50-70% right ICA origin stenosis.     DICTATED:   11/14/2019  EDITED/ls :   11/14/2019           CT Cerebral Perfusion With & Without Contrast [[id number]] Collected:  11/14/19 1758     Updated:  11/14/19 1914    Narrative:       EXAMINATION: CT CEREBRAL PERFUSION WWO CONTRAST - 11/14/2019     INDICATION: TIA, initial screening      TECHNIQUE: Cerebral perfusion analysis was performed using computed  tomography with contrast administration including postprocessing of  parametric maps with determination of cerebral blood flow, cerebral  blood volume, and mean transit time.     The radiation dose reduction device was turned on for each scan per the  ALARA (As Low as Reasonably Achievable) protocol.     COMPARISON: PET/CT scan of same date     FINDINGS: There is diffusely increased mean transit time and time to  drain to the  left MCA territory. There is relatively mildly decreased  cerebral blood flow mostly through the superior portion of left MCA  territory and along the insular cortex. Cerebral blood volume, however,  appears normal.       Impression:       Slow flow throughout the left MCA territory and moderately  extensive ischemia. No evidence of significant core infarct.     DICTATED:   11/14/2019  EDITED/ls :   11/14/2019        XR Chest 1 View [[id number]] Collected:  11/14/19 1810     Updated:  11/14/19 1901    Narrative:          EXAMINATION: XR CHEST 1 VW - 11/14/2019      INDICATION: Stroke protocol.     COMPARISON: 09/24/2019     FINDINGS: Patchy dense interstitial disease in the left base and diffuse  interstitial disease elsewhere appear unchanged. No lung consolidation,  effusion or pneumothorax is seen. The heart is normal in size.           Impression:       Stable exam with diffuse interstitial disease, densest in  the left lung base. No clearly new or progressive chest pathology is  seen.     DICTATED:   11/14/2019  EDITED/ls :   11/14/2019           CT Head Without Contrast Stroke Protocol [[id number]] Collected:  11/14/19 1754     Updated:  11/14/19 1759    Narrative:       EXAMINATION: CT HEAD WO CONTRAST STROKE PROTOCOL-      INDICATION: Stroke     TECHNIQUE: 5 mm unenhanced images through the brain     The radiation dose reduction device was turned on for each scan per the  ALARA (As Low as Reasonably Achievable) protocol.     COMPARISON: 09/24/2019     FINDINGS: The calvarium appears intact. Included paranasal sinuses and  mastoids appear clear. Soft tissue window images show expected  generalized cerebral atrophy for age. There is no evidence of  intracranial hemorrhage, mass, mass effect, infarct, hydrocephalus, or  abnormal extra-axial collection.             Impression:       Advanced generalized cerebral atrophy expected for age. No  evidence of acute intracranial disease.           Note: Exam time is  shown as 5:32 PM. Study was dictated and signed at  5:56 PM.        This report was finalized on 11/14/2019 5:56 PM by DR. Joni Mitchell MD.           Results for orders placed during the hospital encounter of 09/23/19   Adult Transthoracic Echo Complete W/ Cont if Necessary Per Protocol (With Agitated Saline)    Narrative · Left ventricular wall thickness is consistent with mild concentric   hypertrophy.  · Estimated EF = 58%.  · Left ventricular systolic function is normal.  · Left atrial cavity size is mildly dilated.  · Normal right ventricular cavity size, wall thickness, systolic function   and septal motion noted.  · Saline test results are negative.  · The aortic valve exhibits mild sclerosis.  · Mild MAC is present.  · No evidence of pulmonary hypertension is present.  · There is no evidence of pericardial effusion.          Assessment/Plan   Assessment / Plan     Active Hospital Problems    Diagnosis POA   • **Suspected cerebrovascular accident (CVA) [R09.89] Yes   • A-fib (CMS/HCC) [I48.91] Yes   • Expressive aphasia [R47.01] Yes   • Occlusion of left carotid artery [I65.22] Yes   • Cerebrovascular accident (CVA) due to embolism of left middle cerebral artery (CMS/HCC) [I63.412] Yes   • CKD (chronic kidney disease) stage 3, GFR 30-59 ml/min (CMS/HCC) [N18.3] Yes   • DM2 (diabetes mellitus, type 2) (CMS/HCC) [E11.9] Yes   • HLD (hyperlipidemia) [E78.5] Yes   • HTN (hypertension) [I10] Yes   • Vascular dementia (CMS/HCC) [F01.50] Yes     88-year-old male presenting to the ED with complaint of weakness and slurred speech who was found to have concern for hypo-perfusion secondary to chronic left ICA occlusion.    CVA rule out/occlusion of left carotid artery  - likely secondary to hypoglycemia with glucose in the 30s upon arrival. Sx improved s/p dextrose. Will need discontinuation of sulfonylurea longterm at his age.   IVF per neuro recommendations  - Neurology consult in the a.m.  -Fall precautions  -PT/OT  consult in the a.m.  - Continue high-dose statin, currently on atorvastatin 80 mg  - Hold metoprolol for tonight to allow for permissive hypertension  - Recent echo in September  - CBC, BMP in the a.m.    Atrial fib   - Rate controlled, but holding metoprolol for tonight  - Continue Eliquis twice daily, will change to 2.5mg for tonight due to worsening renal function. If renal function improved in am, will need to change back to 5mg   -EKG in the a.m.    Diabetes mellitus 2, complicated by hypoglycemia (sulfonylurea)  -FSBG before meals at bedtime  -SS insulin  -Hemoglobin A1c on 9/24/2019 reported to be 6.60  - Hold metformin and glipizide (needs discontinuation given hypoglycemia event)    Hyperlipidemia  - Continue high-dose statin  - Recent lipid panel 9/24/2019    Hypertension  -Controlled with metoprolol and lisinopril, hold for tonight to allow for permissive hypertension  -Plan to resume in the a.m.    Vascular dementia  - Continue Aricept    CKD 3  -cr+ slightly increased from baseline  -possibly related to hypoglycemic event   -IVF overnight  -BMP in am       DVT prophylaxis: On Eliquis    CODE STATUS:    Code Status and Medical Interventions:   Ordered at: 11/14/19 2021     Code Status:    CPR     Medical Interventions (Level of Support Prior to Arrest):    Full       Admission Status:  I believe this patient meets OBSERVATION status, however if further evaluation or treatment plans warrant, status may change.  Based upon current information, I predict patient's care encounter to be less than or equal to 2 midnights.      Electronically signed by JOAO Tinsley, 11/14/19, 7:53 PM.        Attending   Admission Attestation       I have seen and examined the patient, performing an independent face-to-face diagnostic evaluation with plan of care reviewed and developed with the advanced practice clinician (APC).      Brief Summary Statement:   Adriano Torres is a 88 y.o. male with COPD, type 2  diabetes on sulfonylurea, hypertension, hyperlipidemia, CKD, carotid artery stenosis, known left internal carotid artery occlusion, paroxysmal atrial fibrillation on Eliquis, vascular dementia, recent TIA admission who presents to the ER with complaints of unilateral weakness and slurred speech.  Family reports that around lunchtime today, patient began having stumbling and right-sided weakness with difficulty walking.  Shortly after lunch, he was noted to have slurred speech.  EMS was called and patient was noted to have a blood glucose of 35.  Patient given dextrose with improvement in his symptoms overall.  Patient is currently admitted for further evaluation  Remainder of detailed HPI is as noted by APC and has been reviewed and/or edited by me for completeness.    Attending Physical Exam:  Constitutional: Awake, alert  Eyes: PERRLA, sclerae anicteric, no conjunctival injection  HENT: NCAT, mucous membranes moist  Neck: Supple, no thyromegaly, no lymphadenopathy, trachea midline  Respiratory: Clear to auscultation bilaterally, nonlabored respirations   Cardiovascular: irr irr, reg rate, no murmurs, rubs, or gallops, palpable pedal pulses bilaterally  Gastrointestinal: Positive bowel sounds, soft, nontender, nondistended  Musculoskeletal: No bilateral ankle edema, no clubbing or cyanosis to extremities  Psychiatric: Appropriate affect, cooperative  Neurologic: Oriented x 3, strength symmetric in all extremities on my exam in comparison to above. , Cranial Nerves grossly intact to confrontation, speech clear  Skin: No rashes      Brief Assessment/Plan :  See detailed assessment and plan developed with APC which I have reviewed and/or edited for completeness.    Electronically signed by Jeffery Pace DO, 11/15/19, 3:43 AM.

## 2019-11-15 NOTE — PLAN OF CARE
Problem: Patient Care Overview  Goal: Plan of Care Review  Outcome: Ongoing (interventions implemented as appropriate)   11/15/19 7791   Coping/Psychosocial   Plan of Care Reviewed With patient   Plan of Care Review   Progress improving   OTHER   Outcome Summary VSS. BS stable. Pt denies pain. Worked well with PT today. NIHSS score today was 1. Neurology suspects TIA secondary to hypoglycemia. BP med adjustments made today to prevent SBP dropping below 120. Pt is pleasant and compliant with care. Plan to discharge tmrw.      Goal: Individualization and Mutuality  Outcome: Ongoing (interventions implemented as appropriate)    Goal: Discharge Needs Assessment  Outcome: Ongoing (interventions implemented as appropriate)    Goal: Interprofessional Rounds/Family Conf  Outcome: Ongoing (interventions implemented as appropriate)      Problem: Fall Risk (Adult)  Goal: Identify Related Risk Factors and Signs and Symptoms  Outcome: Outcome(s) achieved Date Met: 11/15/19    Goal: Absence of Fall  Outcome: Ongoing (interventions implemented as appropriate)      Problem: Stroke (Ischemic) (Adult)  Goal: Signs and Symptoms of Listed Potential Problems Will be Absent, Minimized or Managed (Stroke)  Outcome: Ongoing (interventions implemented as appropriate)

## 2019-11-15 NOTE — PLAN OF CARE
Problem: Patient Care Overview  Goal: Plan of Care Review   11/15/19 1417   Coping/Psychosocial   Plan of Care Reviewed With patient;daughter   SLP evaluation completed. Will sign-off as no new deficits identified with speech, language or cognition. Daughters report patient is at baseline. Please see note for further details and recommendations.      Problem: Stroke (Ischemic) (Adult)  Goal: Signs and Symptoms of Listed Potential Problems Will be Absent, Minimized or Managed (Stroke)  Outcome: Ongoing (interventions implemented as appropriate)   11/15/19 1417   Goal/Outcome Evaluation   Problems Assessed (Stroke (Ischemic)) cognitive impairment;communication impairment   Problems Assessed (Stroke (Ischemic)) none

## 2019-11-15 NOTE — CONSULTS
Neurology    Referring provider:   No referring provider defined for this encounter.    Reason for Consultation: TIA    Chief complaint: Global weakness    History of present illness: 88-year-old man known to me from previous admissions recently referred for transient global worsening of his neurologic function.    Yesterday morning he woke and was weaker all over.  It affected his lower extremities is severely to the point that he could not walk.  The family felt his right side was weaker than his left.    After lunch she was noted to have slurred speech and when the EMS got there his blood sugar was 35.    He is given IV glucose and these improved.    He is been told that he does not need to be on glipizide.    On his last visit Dr. Peterson had him stop the baby aspirin that he was on.    Apparently on the way in here he had his Eliquis cut or it was cut prior to coming it is not clear from 5 mg twice daily to 2-1/2 mg twice daily.    Last night his blood pressure at its lowest was 102 systolic.    He is on lisinopril 10 mg daily and metoprolol 1/2 mg twice daily for atrial flutter.      Review of Systems: All systems reviewed but the patient is demented and the quick answers are inaccurate    Home meds:   Medications Prior to Admission   Medication Sig Dispense Refill Last Dose   • apixaban (ELIQUIS) 5 MG tablet tablet Take 1 tablet by mouth Every 12 (Twelve) Hours. 60 tablet 0 11/14/2019 at Unknown time   • atorvastatin (LIPITOR) 80 MG tablet Take 1 tablet by mouth Every Night. 30 tablet 0 11/13/2019 at Unknown time   • donepezil (ARICEPT) 5 MG tablet Take 1 tablet by mouth Every Night. 30 tablet 0 11/13/2019 at Unknown time   • glipiZIDE (GLUCOTROL) 10 MG tablet Take 10 mg by mouth 2 (Two) Times a Day Before Meals.   11/14/2019 at Unknown time   • lisinopril (PRINIVIL,ZESTRIL) 10 MG tablet Take 10 mg by mouth Daily.   11/14/2019 at Unknown time   • loratadine (CLARITIN) 10 MG tablet Take 10 mg by mouth Daily.    "2019 at Unknown time   • metFORMIN (GLUCOPHAGE) 500 MG tablet Take 1,000 mg by mouth 2 (Two) Times a Day With Meals.   2019 at Unknown time   • metoprolol tartrate (LOPRESSOR) 25 MG tablet Take 0.5 tablets by mouth Every 12 (Twelve) Hours. 30 tablet 0 2019 at Unknown time       History  Past Medical History:   Diagnosis Date   • Bilateral renal cysts    • CKD (chronic kidney disease) stage 3, GFR 30-59 ml/min (CMS/[facility])    • COPD (chronic obstructive pulmonary disease) (CMS/[facility])    • DJD (degenerative joint disease), lumbar    • DM2 (diabetes mellitus, type 2) (CMS/[facility])    • Generalized osteoarthritis    • HLD (hyperlipidemia)    • HTN (hypertension)    • Kidney stones    • PAF (paroxysmal atrial fibrillation) (CMS/[facility])    • Vascular dementia (CMS/[facility])    ,   Past Surgical History:   Procedure Laterality Date   • COLONOSCOPY     ,   Family History   Problem Relation Age of Onset   • Heart attack Mother    • Heart attack Father    ,   Social History     Tobacco Use   • Smoking status: Former Smoker     Packs/day: 1.00     Years: 15.00     Pack years: 15.00     Types: Cigarettes     Last attempt to quit: 10/25/1996     Years since quittin.0   • Smokeless tobacco: Never Used   Substance Use Topics   • Alcohol use: No     Frequency: Never   • Drug use: No    and Allergies:  Pollen extract,    Vital Signs   Blood pressure 124/99, pulse 79, temperature 98.9 °F (37.2 °C), temperature source Oral, resp. rate 18, height 175.3 cm (69\"), weight 68.2 kg (150 lb 4.8 oz), SpO2 92 %.  Body mass index is 22.2 kg/m².    Physical Exam:   General: Pleasant elderly male in no distress              Head: No trauma              Neck: Bilateral bruits              Resp: Normal breath sounds              Cor: Regular rhythm              Extremities: No edema              Skin: Warm and dry              Neuro: Awake alert and oriented person and place.    His short-term memory is poor.    Speech is articulate with no " word finding problems.    Coordination is normal and finger-nose testing bilaterally.    Cranial nerves show benign fundi full visual fields sensation is symmetrical.    Palate elevates normally tongue protrudes normally.    Reflexes 1+ and equal bilaterally.    Sensory testing is intact bilaterally.    Motor testing shows equal  no pronator drift.        Results Review: CT angiogram shows an occluded left ICA is a 50 to 70% right ICA stenosis    Labs:  Lab Results (last 72 hours)     Procedure Component Value Units Date/Time    POC Glucose Once [[id number]]  (Abnormal) Collected:  11/15/19 1113    Specimen:  Blood Updated:  11/15/19 1116     Glucose 251 mg/dL     POC Glucose Once [[id number]]  (Abnormal) Collected:  11/15/19 0747    Specimen:  Blood Updated:  11/15/19 0750     Glucose 135 mg/dL     Basic Metabolic Panel [[id number]]  (Abnormal) Collected:  11/15/19 0625    Specimen:  Blood Updated:  11/15/19 0721     Glucose 124 mg/dL      BUN 22 mg/dL      Creatinine 1.30 mg/dL      Sodium 136 mmol/L      Potassium 4.0 mmol/L      Chloride 103 mmol/L      CO2 22.0 mmol/L      Calcium 8.2 mg/dL      eGFR Non African Amer 52 mL/min/1.73      BUN/Creatinine Ratio 16.9     Anion Gap 11.0 mmol/L     Narrative:       GFR Normal >60  Chronic Kidney Disease <60  Kidney Failure <15    CBC Auto Differential [[id number]]  (Abnormal) Collected:  11/15/19 0625    Specimen:  Blood Updated:  11/15/19 0721     WBC 6.42 10*3/mm3      RBC 3.44 10*6/mm3      Hemoglobin 10.7 g/dL      Hematocrit 33.5 %      MCV 97.4 fL      MCH 31.1 pg      MCHC 31.9 g/dL      RDW 14.4 %      RDW-SD 51.4 fl      MPV 10.0 fL      Platelets 217 10*3/mm3      Neutrophil % 70.8 %      Lymphocyte % 17.8 %      Monocyte % 9.2 %      Eosinophil % 1.4 %      Basophil % 0.3 %      Immature Grans % 0.5 %      Neutrophils, Absolute 4.55 10*3/mm3      Lymphocytes, Absolute 1.14 10*3/mm3      Monocytes, Absolute 0.59 10*3/mm3      Eosinophils, Absolute 0.09  10*3/mm3      Basophils, Absolute 0.02 10*3/mm3      Immature Grans, Absolute 0.03 10*3/mm3      nRBC 0.0 /100 WBC     POC Glucose Once [[id number]]  (Abnormal) Collected:  11/15/19 0137    Specimen:  Blood Updated:  11/15/19 0139     Glucose 134 mg/dL     POC Glucose Once [[id number]]  (Abnormal) Collected:  11/15/19 0015    Specimen:  Blood Updated:  11/15/19 0017     Glucose 160 mg/dL     POC Glucose Once [[id number]]  (Normal) Collected:  11/14/19 2320    Specimen:  Blood Updated:  11/14/19 2321     Glucose 122 mg/dL     POC Glucose Once [[id number]]  (Normal) Collected:  11/14/19 2215    Specimen:  Blood Updated:  11/14/19 2234     Glucose 91 mg/dL     POC Glucose Once [[id number]]  (Abnormal) Collected:  11/14/19 2124    Specimen:  Blood Updated:  11/14/19 2144     Glucose 61 mg/dL     POC Glucose Once [[id number]]  (Abnormal) Collected:  11/14/19 2051    Specimen:  Blood Updated:  11/14/19 2054     Glucose 42 mg/dL     POC Glucose Once [[id number]]  (Abnormal) Collected:  11/14/19 2048    Specimen:  Blood Updated:  11/14/19 2053     Glucose 43 mg/dL     [place] Draw [[id number]] Collected:  11/14/19 1745    Specimen:  Blood Updated:  11/14/19 2035    Narrative:       The following orders were created for panel order [place] Draw.  Procedure                               Abnormality         Status                     ---------                               -----------         ------                     Light Blue Top[[id number]]                                   Final result               Green Top (Gel)[[id number]]                                  Final result               Lavender Top[[id number]]                                     Final result               Gold Top - SST[[id number]]                                   Final result               Green Top (No Gel)[[id number]]                                                            Please view results for these tests on the individual orders.    Green Top (Gel)  [[id number]] Collected:  11/14/19 1746    Specimen:  Blood Updated:  11/14/19 1901     Extra Tube Hold for add-ons.     Comment: Auto resulted.       Lavender Top [[id number]] Collected:  11/14/19 1746    Specimen:  Blood Updated:  11/14/19 1901     Extra Tube hold for add-on     Comment: Auto resulted       Light Blue Top [[id number]] Collected:  11/14/19 1745    Specimen:  Blood Updated:  11/14/19 1846     Extra Tube hold for add-on     Comment: Auto resulted       Gold Top - SST [[id number]] Collected:  11/14/19 1745    Specimen:  Blood Updated:  11/14/19 1846     Extra Tube Hold for add-ons.     Comment: Auto resulted.       AST [[id number]]  (Normal) Collected:  11/14/19 1746    Specimen:  Blood Updated:  11/14/19 1825     AST (SGOT) 24 U/L     ALT [[id number]]  (Normal) Collected:  11/14/19 1746    Specimen:  Blood Updated:  11/14/19 1825     ALT (SGPT) 17 U/L     aPTT [[id number]]  (Abnormal) Collected:  11/14/19 1745    Specimen:  Blood Updated:  11/14/19 1816     PTT 40.4 seconds     Narrative:       PTT = The equivalent PTT values for the therapeutic range of heparin levels at 0.3 to 0.5 U/ml are 55 to 70 seconds.    Troponin [[id number]]  (Normal) Collected:  11/14/19 1746    Specimen:  Blood Updated:  11/14/19 1816     Troponin T <0.010 ng/mL     Narrative:       Troponin T Reference Range:  <= 0.03 ng/mL-   Negative for AMI  >0.03 ng/mL-     Abnormal for myocardial necrosis.  Clinicians would have to utilize clinical acumen, EKG, Troponin and serial changes to determine if it is an Acute Myocardial Infarction or myocardial injury due to an underlying chronic condition.     CBC & Differential [[id number]] Collected:  11/14/19 1746    Specimen:  Blood Updated:  11/14/19 1758    Narrative:       The following orders were created for panel order CBC & Differential.  Procedure                               Abnormality         Status                     ---------                               -----------          ------                     CBC Auto Differential[[id number]]        Abnormal            Final result                 Please view results for these tests on the individual orders.    CBC Auto Differential [[id number]]  (Abnormal) Collected:  11/14/19 1746    Specimen:  Blood Updated:  11/14/19 1758     WBC 7.32 10*3/mm3      RBC 3.73 10*6/mm3      Hemoglobin 11.7 g/dL      Hematocrit 36.6 %      MCV 98.1 fL      MCH 31.4 pg      MCHC 32.0 g/dL      RDW 14.3 %      RDW-SD 51.7 fl      MPV 10.0 fL      Platelets 242 10*3/mm3      Neutrophil % 72.3 %      Lymphocyte % 18.2 %      Monocyte % 7.8 %      Eosinophil % 1.0 %      Basophil % 0.3 %      Immature Grans % 0.4 %      Neutrophils, Absolute 5.30 10*3/mm3      Lymphocytes, Absolute 1.33 10*3/mm3      Monocytes, Absolute 0.57 10*3/mm3      Eosinophils, Absolute 0.07 10*3/mm3      Basophils, Absolute 0.02 10*3/mm3      Immature Grans, Absolute 0.03 10*3/mm3      nRBC 0.0 /100 WBC     POC Protime / INR [[id number]]  (Abnormal) Collected:  11/14/19 1742    Specimen:  Blood Updated:  11/14/19 1746     Protime 18.5 seconds      INR 1.6     Comment: Serial Number: [id number]  [id number]       POC CHEM 8 [[id number]]  (Abnormal) Collected:  11/14/19 1742    Specimen:  Blood Updated:  11/14/19 1746     Glucose 89 mg/dL      BUN 30 mg/dL      Creatinine 1.80 mg/dL      Sodium 134 mmol/L      Potassium 4.3 mmol/L      Chloride 97 mmol/L      Total CO2 24 mmol/L      Hemoglobin 12.9 g/dL      Comment: Serial Number: [id number]:  [id number]        Hematocrit 38 %      Ionized Calcium 1.15 mmol/L           Rads:  Imaging Results (Last 72 Hours)     Procedure Component Value Units Date/Time    CT Angiogram Head [[id number]] Collected:  11/14/19 1902     Updated:  11/14/19 1916    Narrative:       EXAMINATION: CT ANGIOGRAM HEAD - 11/14/2019     INDICATION: Right-sided weakness, slurred speech.     TECHNIQUE: Pre- and postcontrast 3 mm and 0.75 mm unenhanced and  enhanced images through  the brain with 2D angiographic reconstructions.     The radiation dose reduction device was turned on for each scan per the  ALARA (As Low as Reasonably Achievable) protocol.     COMPARISON: Cerebral perfusion study of same date. CTA neck of same  date.     FINDINGS: Note is again made of absent contrast in the distal left ICA,  with reconstitution of the supraclinoid ICA. Distal right ICA shows  moderate atherosclerotic disease but no evidence of high-grade stenosis.     Anterior cerebral arteries are symmetric and normal in size. Middle  cerebral arteries and proximal branches appear symmetric and normal.  Distal vertebral arteries are asymmetric with hypoplastic left vertebral  artery supplying the cerebellum and normal-caliber right vertebral  artery continuing as the basilar artery. Basilar artery is relatively  small as there are bilateral posterior communicating arteries. No  significant right or left posterior cerebral artery stenosis is seen.       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 No evidence of significant intracranial  vascular stenosis is seen elsewhere.     DICTATED:   11/14/2019  EDITED/ls :   11/14/2019        CT Angiogram Neck [[id number]] Collected:  11/14/19 1855     Updated:  11/14/19 1914    Narrative:       EXAMINATION: CT ANGIOGRAM NECK - 11/14/2019     INDICATION: Right-sided weakness, slurred speech.      TECHNIQUE: Pre- and postcontrast 3 mm and 0.75 mm axial images through  the neck with 2D angiographic reconstructions of the carotid arteries.     The radiation dose reduction device was turned on for each scan per the  ALARA (As Low as Reasonably Achievable) protocol.     COMPARISON: None     FINDINGS: There is moderate narrowing of the proximal left subclavian  artery. Innominate and right subclavian appear grossly normal.     On the right, no significant common carotid stenosis is seen up to level  of the carotid  bulb. There is eccentric calcification but probably only  mild right ICA origin stenosis. Beyond its origin, very focal,  approximately 50-70% narrowing, and then the ICA returns to normal  caliber. The right ECA appears grossly normal.     Left common carotid appears grossly normal-appearing up to the  bifurcation where there is fairly heavy circumferential calcification of  the carotid bulb. There is apparent occlusion of the left ICA just  beyond its origin. Left ECA appears relatively small but normal. These  images show the distal intracranial ICA to be reconstituted. Right  vertebral artery appears normal. Left vertebral artery is small but  normal.       Impression:       1. Occluded left ICA just beyond its origin.  2. 50-70% right ICA origin stenosis.     DICTATED:   11/14/2019  EDITED/ls :   11/14/2019           CT Cerebral Perfusion With & Without Contrast [[id number]] Collected:  11/14/19 1758     Updated:  11/14/19 1914    Narrative:       EXAMINATION: CT CEREBRAL PERFUSION WWO CONTRAST - 11/14/2019     INDICATION: TIA, initial screening      TECHNIQUE: Cerebral perfusion analysis was performed using computed  tomography with contrast administration including postprocessing of  parametric maps with determination of cerebral blood flow, cerebral  blood volume, and mean transit time.     The radiation dose reduction device was turned on for each scan per the  ALARA (As Low as Reasonably Achievable) protocol.     COMPARISON: PET/CT scan of same date     FINDINGS: There is diffusely increased mean transit time and time to  drain to the left MCA territory. There is relatively mildly decreased  cerebral blood flow mostly through the superior portion of left MCA  territory and along the insular cortex. Cerebral blood volume, however,  appears normal.       Impression:       Slow flow throughout the left MCA territory and moderately  extensive ischemia. No evidence of significant core infarct.     DICTATED:    11/14/2019  EDITED/ls :   11/14/2019        XR Chest 1 View [[id number]] Collected:  11/14/19 1810     Updated:  11/14/19 1901    Narrative:          EXAMINATION: XR CHEST 1 VW - 11/14/2019      INDICATION: Stroke protocol.     COMPARISON: 09/24/2019     FINDINGS: Patchy dense interstitial disease in the left base and diffuse  interstitial disease elsewhere appear unchanged. No lung consolidation,  effusion or pneumothorax is seen. The heart is normal in size.           Impression:       Stable exam with diffuse interstitial disease, densest in  the left lung base. No clearly new or progressive chest pathology is  seen.     DICTATED:   11/14/2019  EDITED/ls :   11/14/2019           CT Head Without Contrast Stroke Protocol [[id number]] Collected:  11/14/19 1754     Updated:  11/14/19 1759    Narrative:       EXAMINATION: CT HEAD WO CONTRAST STROKE PROTOCOL-      INDICATION: Stroke     TECHNIQUE: 5 mm unenhanced images through the brain     The radiation dose reduction device was turned on for each scan per the  ALARA (As Low as Reasonably Achievable) protocol.     COMPARISON: 09/24/2019     FINDINGS: The calvarium appears intact. Included paranasal sinuses and  mastoids appear clear. Soft tissue window images show expected  generalized cerebral atrophy for age. There is no evidence of  intracranial hemorrhage, mass, mass effect, infarct, hydrocephalus, or  abnormal extra-axial collection.             Impression:       Advanced generalized cerebral atrophy expected for age. No  evidence of acute intracranial disease.           Note: Exam time is shown as 5:32 PM. Study was dictated and signed at  5:56 PM.        This report was finalized on 11/14/2019 5:56 PM by DR. Joni Mitchell MD.               Assessment: Transient ischemic attack secondary to hypoglycemia       Plan:    Resume aspirin 81 mg daily.    Continue Eliquis 5 mg twice daily.    Decrease lisinopril to 5 mg daily.    Discontinue glipizide    Comment:   Given  the patient's high-grade carotid disease even a blood pressure of 102 can be low enough to cause ischemic symptoms.    His blood pressure should never be lower than 120 and afraid that the combination of metoprolol lisinopril 10 will occasionally get the letter numbers to low.    Standard of care for patients with advanced arteriosclerosis is an antiplatelet agent in addition to an in spite of being on a NOAC.    I discussed the patients findings and my recommendations with patient and family      Hosea Vicente MD  11/15/19  2:34 PM

## 2019-11-15 NOTE — CONSULTS
Attempted to see patient x2 for diabetes education. No family present at bedside. Will continue to follow. Thank you.

## 2019-11-15 NOTE — PROGRESS NOTES
Discharge Planning Assessment  [facility]     Patient Name: Adriano Torres  MRN: [id number]  Today's Date: 11/15/2019    Admit Date: 11/14/2019    Discharge Needs Assessment     Row Name 11/15/19 1218       Living Environment    Lives With  child(guerrero), adult    Name(s) of Who Lives With Patient  daughter, Indiana Donis and Zo Cortez    Current Living Arrangements  home/apartment/condo    Primary Care Provided by  child(guerrero)    Provides Primary Care For  no one, unable/limited ability to care for self    Family Caregiver if Needed  child(guerrero), adult    Quality of Family Relationships  helpful;involved;supportive       Transition Planning    Transportation Anticipated  family or friend will provide       Discharge Needs Assessment    Readmission Within the Last 30 Days  no previous admission in last 30 days    Concerns to be Addressed  discharge planning    Equipment Currently Used at Home  cane, straight Has a rolling walker available if needed    Current Discharge Risk  chronically ill        Discharge Plan     Row Name 11/15/19 6343       Plan    Plan  Home    Patient/Family in Agreement with Plan  yes    Plan Comments  Met with patient and his daughters in the room to initiate discharge planning. Patient lives with his daughters in a home in [facility]. He is normally independent with ADLs and uses a cane with mobility as needed. Patient's goal is home at discharge, and his daughters will provide his ride. PT and OT have recommended home with family and 24/7 care/home health. Patient and daughters deny any discharge needs at this time and have declined home health and outpatient therapy. CM will continue to follow.     Final Discharge Disposition Code  01 - home or self-care        Destination      No service coordination in this encounter.      Durable Medical Equipment      No service coordination in this encounter.      Dialysis/Infusion      No service coordination in this encounter.      Home  Medical Care      No service coordination in this encounter.      Therapy      No service coordination in this encounter.      Community Resources      No service coordination in this encounter.        Expected Discharge Date and Time     Expected Discharge Date Expected Discharge Time    Nov 18, 2019         Demographic Summary     Row Name 11/15/19 1217       General Information    Admission Type  observation    Referral Source  admission list    Reason for Consult  discharge planning    General Information Comments  PCP is Gilbert Mercer       Contact Information    Permission Granted to Share Info With  ;family/designee daughters, Indiana Donis or Zo Cortez        Functional Status     Row Name 11/15/19 1217       Functional Status    Usual Activity Tolerance  moderate       Functional Status, IADL    Medications  assistive person    Meal Preparation  independent;assistive person    Housekeeping  independent;assistive person    Laundry  independent;assistive person    Shopping  independent;assistive person       Employment/    Employment/ Comments  Confirmed with patient's daughters that he has medical coverage through Medicare A & B only (no supplement) and rx coverage through Medicare D.         Psychosocial    No documentation.       Abuse/Neglect    No documentation.       Legal    No documentation.       Substance Abuse    No documentation.       Patient Forms    No documentation.           Alexa Thomas RN

## 2019-11-15 NOTE — THERAPY DISCHARGE NOTE
Acute Care - Speech Language Pathology Initial Evaluation  [facility] Cognitive-Communication Evaluation       Patient Name: Adriano Torres  : 1931  MRN: [id number]  Today's Date: 11/15/2019               Admit Date: 2019     Visit Dx:    ICD-10-CM ICD-9-CM   1. Expressive aphasia R47.01 784.3   2. Acute right-sided weakness R53.1 728.87   3. Impaired mobility and ADLs Z74.09 799.89     Patient Active Problem List   Diagnosis   • Cerebrovascular accident (CVA) due to embolism of left middle cerebral artery (CMS/[facility])   • Atrial flutter (CMS/HCC)   • CKD (chronic kidney disease) stage 3, GFR 30-59 ml/min (CMS/[facility])   • DM2 (diabetes mellitus, type 2) (CMS/[facility])   • HLD (hyperlipidemia)   • HTN (hypertension)   • Vascular dementia (CMS/[facility])   • Pulmonary lesion, left   • Stroke due to embolism of middle cerebral artery (CMS/[facility])   • Occlusion of left carotid artery   • A-fib (CMS/[facility])   • Suspected cerebrovascular accident (CVA)   • Expressive aphasia     Past Medical History:   Diagnosis Date   • Bilateral renal cysts    • CKD (chronic kidney disease) stage 3, GFR 30-59 ml/min (CMS/[facility])    • COPD (chronic obstructive pulmonary disease) (CMS/HCC)    • DJD (degenerative joint disease), lumbar    • DM2 (diabetes mellitus, type 2) (CMS/HCC)    • Generalized osteoarthritis    • HLD (hyperlipidemia)    • HTN (hypertension)    • Kidney stones    • PAF (paroxysmal atrial fibrillation) (CMS/[facility])    • Vascular dementia (CMS/HCC)      Past Surgical History:   Procedure Laterality Date   • COLONOSCOPY          SLP EVALUATION (last 72 hours)      SLP SLC Evaluation     Row Name 11/15/19 1345                   Communication Assessment/Intervention    Document Type  evaluation  -CH        Subjective Information  no complaints  -CH        Patient Observations  alert;cooperative;agree to therapy  -CH        Patient/Family Observations  2 daughters present  -CH        Patient Effort  good  -CH        Comment  Pt [facility]   -           General Information    Patient Profile Reviewed  yes  -CH        Pertinent History Of Current Problem  88 y.o. male with PMH significant CKD, COPD, DM2, HLD, HTN, carotid artery stenosis, PAF on Eliquis, vascular dementia who presented to the ED with complaint of weakness and slurred speech.. SLC evaluation completed per stroke protocol.   -        Precautions/Limitations, Vision  WFL;for purposes of eval  -CH        Precautions/Limitations, Hearing  WFL;for purposes of eval  -CH        Prior Level of Function-Communication  cognitive-linguistic impairment;other (see comments) daughters reported dementia at baseline  -        Plans/Goals Discussed with  patient and family;agreed upon  -        Barriers to Rehab  none identified  -        Patient's Goals for Discharge  return to home  -           Pain Assessment    Additional Documentation  Pain Scale: FACES Pre/Post-Treatment (Group)  -           Pain Scale: FACES Pre/Post-Treatment    Pain: FACES Scale, Pretreatment  0-->no hurt  -        Pain: FACES Scale, Post-Treatment  0-->no hurt  -           Comprehension Assessment/Intervention    Comprehension Assessment/Intervention  Auditory Comprehension  -           Auditory Comprehension Assessment/Intervention    Auditory Comprehension (Communication)  mild impairment  -        Able to Identify Objects/Pictures (Communication)  familiar objects;WFL  -        Answers Questions (Communication)  yes/no;wh questions;personal;simple;concrete;WFL  -        Able to Follow Commands (Communication)  1-step;2-step;L  -        Narrative Discourse  conversational level;WFL  -        Successful Auditory Strategies (Communication)  repetition;visual cues  -           Expression Assessment/Intervention    Expression Assessment/Intervention  verbal expression  -           Verbal Expression Assessment/Intervention    Verbal Expression  WFL  -        Automatic Speech (Communication)   response to greeting;counting 1-20;days of week;WNL  -        Repetition  words;sentences;WFL  -        Phrase Completion  automatic/predictable;[facility]  -        Responsive Naming  simple;[facility]  -        Confrontational Naming  high frequency;low frequency;[facility]  -        Spontaneous/Functional Words  simple;[facility]  -        Sentence Formulation  simple;complex;L  -        Conversational Discourse/Fluency  WF  -           Oral Motor Structure and Function    Oral Motor Structure and Function  WNL  -        Dentition Assessment  edentulous, does not have dentures  -        Mucosal Quality  moist, healthy  -           Oral Musculature and Cranial Nerve Assessment    Oral Motor General Assessment  WFL  -           Motor Speech Assessment/Intervention    Motor Speech Function  [facility]  -           Cursory Voice Assessment/Intervention    Quality and Resonance (Voice)  [facility]  -           Cognitive Assessment Intervention- SLP    Cognitive Function (Cognition)  moderate impairment  -        Orientation Status (Cognition)  awareness of basic personal information;person;situation;WFL;place;time;moderate impairment  -        Memory (Cognitive)  simple;immediate;WFL;mod-complex;short-term;delayed;moderate impairment  -        Attention (Cognitive)  selective;sustained;L  -        Reasoning (Cognitive)  simple;deductive;L  -        Functional Math (Cognitive)  moderate impairment;other (see comments) impacted by memory deficits  -        Executive Function (Cognition)  [facility]  -        Pragmatics (Communication)  [facility]  -           SLP Clinical Impressions    SLP Diagnosis  No new speeh, language or cognitive deficits noted per evaluation. Daughters reported patient at baseline with cognitive deficits d/t dementia.   -        SLC Criteria for Skilled Therapy Interventions Met  no problems identified which require skilled intervention;baseline status  -        Functional Impact  no impact on  function  -           Recommendations    Therapy Frequency (SLP SLC)  evaluation only  -        Anticipated Dischage Disposition  unknown  -           SLP Discharge Summary    Discharge Destination  unknown  -        Discharge Diagnostic Statement  No new speeh, language or cognitive deficits noted per evaluation. Daughters reported patient at baseline with cognitive deficits d/t dementia.   -        Progress Toward Achieving Short/long Term Goals  discharge on same date as initial evaluation  -        Reason for Discharge  all goals and outcomes met, no further needs identified  -          User Key  (r) = Recorded By, (t) = Taken By, (c) = Cosigned By    Initials Name Effective Dates    Padma Banuelos MS CCC-SLP 02/14/19 -              EDUCATION  The patient has been educated in the following areas:     Cognitive Impairment Communication Impairment.    SLP Recommendation and Plan  SLP Diagnosis: No new speeh, language or cognitive deficits noted per evaluation. Daughters reported patient at baseline with cognitive deficits d/t dementia.      [facility] Criteria for Skilled Therapy Interventions Met: no problems identified which require skilled intervention, baseline status  Anticipated Dischage Disposition: unknown                             Time Calculation:     Time Calculation- SLP     Row Name 11/15/19 1415             Time Calculation- [facility]    SLP Start Time  1345  -      SLP Received On  11/15/19  -        User Key  (r) = Recorded By, (t) = Taken By, (c) = Cosigned By    Initials Name Provider Type    Padma Banuelos MS CCC-SLP Speech and Language Pathologist          Therapy Charges for Today     Code Description Service Date Service Provider Modifiers Qty    91944292019  ST EVAL SPEECH AND PROD W LANG  3 11/15/2019 Padma Galvez MS CCC-SLP GN 1                     Padma Galvez MS CCC-SLP  11/15/2019

## 2019-11-15 NOTE — THERAPY EVALUATION
Acute Care - Occupational Therapy Initial Evaluation  [facility]     Patient Name: Adriano Torres  : 1931  MRN: [id number]  Today's Date: 11/15/2019             Admit Date: 2019       ICD-10-CM ICD-9-CM   1. Expressive aphasia R47.01 784.3   2. Acute right-sided weakness R53.1 728.87   3. Impaired mobility and ADLs Z74.09 799.89     Patient Active Problem List   Diagnosis   • Cerebrovascular accident (CVA) due to embolism of left middle cerebral artery (CMS/[facility])   • Atrial flutter (CMS/HCC)   • CKD (chronic kidney disease) stage 3, GFR 30-59 ml/min (CMS/[facility])   • DM2 (diabetes mellitus, type 2) (CMS/[facility])   • HLD (hyperlipidemia)   • HTN (hypertension)   • Vascular dementia (CMS/HCC)   • Pulmonary lesion, left   • Stroke due to embolism of middle cerebral artery (CMS/HCC)   • Occlusion of left carotid artery   • A-fib (CMS/[facility])   • Suspected cerebrovascular accident (CVA)   • Expressive aphasia     Past Medical History:   Diagnosis Date   • Bilateral renal cysts    • CKD (chronic kidney disease) stage 3, GFR 30-59 ml/min (CMS/HCC)    • COPD (chronic obstructive pulmonary disease) (CMS/HCC)    • DJD (degenerative joint disease), lumbar    • DM2 (diabetes mellitus, type 2) (CMS/HCC)    • Generalized osteoarthritis    • HLD (hyperlipidemia)    • HTN (hypertension)    • Kidney stones    • PAF (paroxysmal atrial fibrillation) (CMS/[facility])    • Vascular dementia (CMS/HCC)      Past Surgical History:   Procedure Laterality Date   • COLONOSCOPY            OT ASSESSMENT FLOWSHEET (last 12 hours)      Occupational Therapy Evaluation     Row Name 11/15/19 0810                   OT Evaluation Time/Intention    Subjective Information  no complaints  -AN        Document Type  evaluation  -AN        Mode of Treatment  occupational therapy  -AN        Patient Effort  good  -AN        Symptoms Noted During/After Treatment  none  -AN        Comment  Pt Resighini  -AN           General Information    Patient Profile Reviewed?   "yes  -AN        Patient Observations  alert;agree to therapy;cooperative  -AN        Patient/Family Observations  no familypresent  -AN        General Observations of Patient  pt supine in bed, IV intact, alarm, dozing  -AN        Prior Level of Function  independent:;all household mobility;community mobility;gait;transfer;ADL's;driving;yard work per pt he drives and works in garden; pt is disoriented  -AN        Equipment Currently Used at Home  cane, straight;shower chair  -AN        Pertinent History of Current Functional Problem  Pt to ED with slurred speech and R side weakness  -AN        Existing Precautions/Restrictions  fall  -AN        Risks Reviewed  patient:;LOB;dizziness;increased discomfort;change in vital signs  -AN        Benefits Reviewed  patient:;improve function;increase independence;increase strength;increase balance  -AN        Barriers to Rehab  none identified  -AN           Relationship/Environment    Primary Source of Support/Comfort  child(guerrero)  -AN        Lives With  -- pt reports he lives alone   -AN        Family Caregiver if Needed  child(guerrero), adult  -AN        Family Caregiver Names  reports he has 3 daughters  -AN        Primary Roles/Responsibilities  retired  -AN           Resource/Environmental Concerns    Current Living Arrangements  home/apartment/condo  -AN           Home Main Entrance    Number of Stairs, Main Entrance  one  -AN           Cognitive Assessment/Interventions    Additional Documentation  Cognitive Assessment/Intervention (Group)  -AN           Cognitive Assessment/Intervention- PT/OT    Affect/Mental Status (Cognitive)  confused  -AN        Orientation Status (Cognition)  oriented to;person;place pt reports \"I have no idea\" when asked month or year  -AN        Follows Commands (Cognition)  follows one step commands;75-90% accuracy;repetition of directions required;verbal cues/prompting required  -AN        Safety Deficit (Cognitive)  moderate deficit;insight into " deficits/self awareness  -AN        Personal Safety Interventions  fall prevention program maintained  -AN        Cognitive Assessment/Intervention Comment  Pt disoriented states his tomatoes are just coming into garden  -AN           Safety Issues, Functional Mobility    Impairments Affecting Function (Mobility)  balance  -AN           Bed Mobility Assessment/Treatment    Bed Mobility Assessment/Treatment  supine-sit  -AN        Supine-Sit [place] (Bed Mobility)  supervision;verbal cues  -AN        Assistive Device (Bed Mobility)  bed rails;head of bed elevated  -AN        Comment (Bed Mobility)  cues to initiate task, pt [place]  -AN           Transfer Assessment/Treatment    Transfer Assessment/Treatment  sit-stand transfer;bed-chair transfer;stand-sit transfer  -AN           Bed-Chair Transfer    Bed-Chair [place] (Transfers)  contact guard;verbal cues  -AN           Sit-Stand Transfer    Sit-Stand [place] (Transfers)  contact guard  -AN           Stand-Sit Transfer    Stand-Sit [place] (Transfers)  verbal cues;contact guard  -AN           ADL Assessment/Intervention    BADL Assessment/Intervention  upper body dressing;lower body dressing;feeding  -AN           Upper Body Dressing Assessment/Training    Upper Body Dressing [place] Level  don;pajama/robe;minimum assist (75% patient effort)  -AN        Upper Body Dressing Position  edge of bed sitting  -AN           Lower Body Dressing Assessment/Training    Lower Body Dressing [place] Level  don;socks;moderate assist (50% patient effort)  -AN        Lower Body Dressing Position  edge of bed sitting  -AN           Self-Feeding Assessment/Training    [place] Level (Feeding)  feeding skills;independent;prepare tray/open items;moderate assist (50% patient effort)  -AN        Position (Self-Feeding)  supported sitting  -AN           BADL Safety/Performance    Impairments, BADL Safety/Performance  balance;cognition  -AN           General  ROM    GENERAL ROM COMMENTS  Melita UE WFL  -AN           MMT (Manual Muscle Testing)    General MMT Comments  [place] UE grossly 4/5, symmetrical  -AN           Motor Assessment/Interventions    Additional Documentation  Balance (Group);Gross Motor Coordination (Group);Therapeutic Exercise (Group)  -AN           Gross Motor Coordination    Gross Motor Skill, Impairments Detail  R mild deficit finger to nose; pt with intentional tremors  -AN           Balance    Balance  static sitting balance;static standing balance;dynamic sitting balance;dynamic standing balance  -AN           Static Sitting Balance    Level of [place] (Unsupported Sitting, Static Balance)  supervision  -AN        Sitting Position (Unsupported Sitting, Static Balance)  sitting on edge of bed  -AN           Dynamic Sitting Balance    Level of [place], Reaches Outside Midline (Sitting, Dynamic Balance)  contact guard assist  -AN        Sitting Position, Reaches Outside Midline (Sitting, Dynamic Balance)  sitting on edge of bed  -AN        Comment, Reaches Outside Midline (Sitting, Dynamic Balance)  weight shift L/R  -AN           Static Standing Balance    Level of [place] (Supported Standing, Static Balance)  contact guard assist  -AN           Sensory Assessment/Intervention    Sensory General Assessment  no sensation deficits identified  -AN        Additional Documentation  Vision Assessment/Intervention (Group)  -AN           Vision Assessment/Intervention    Visual Impairment/Limitations  unable/difficult to assess  -AN        Visual Motor Impairment  visual tracking, left;visual tracking, right not smooth pursuits  -AN           Positioning and Restraints    Pre-Treatment Position  in bed  -AN        Post Treatment Position  chair  -AN        In Chair  notified nsg;call light within reach;encouraged to call for assist;sitting;exit alarm on  -AN           Pain Assessment    Additional Documentation  Pain Scale: Numbers  Pre/Post-Treatment (Group)  -AN           Pain Scale: Numbers Pre/Post-Treatment    Pain Scale: Numbers, Pretreatment  0/10 - no pain  -AN        Pain Scale: Numbers, Post-Treatment  0/10 - no pain  -AN           Plan of Care Review    Plan of Care Reviewed With  patient  -AN           Clinical Impression (OT)    OT Diagnosis  impaired ADLs  -AN        Patient/Family Goals Statement (OT Eval)  agreeable to OT  -AN        Criteria for Skilled Therapeutic Interventions Met (OT Eval)  yes;treatment indicated  -AN        Rehab Potential (OT Eval)  good, to achieve stated therapy goals  -AN        Therapy Frequency (OT Eval)  daily  -AN        Care Plan Review (OT)  evaluation/treatment results reviewed  -AN        Anticipated Discharge Disposition (OT)  home with 24/7 care;home with home health  -AN           Vital Signs    Pre Systolic BP Rehab  164  -AN        Pre Treatment Diastolic BP  74  -AN        Post Systolic BP Rehab  155  -AN        Post Treatment Diastolic BP  83  -AN        Pretreatment Heart Rate (beats/min)  94  -AN        Posttreatment Heart Rate (beats/min)  102  -AN        Post SpO2 (%)  90 notified RN  -AN        O2 Delivery Post Treatment  room air  -AN           OT Goals    Transfer Goal Selection (OT)  transfer, OT goal 1  -AN        Bathing Goal Selection (OT)  bathing, OT goal 1  -AN        Dressing Goal Selection (OT)  dressing, OT goal 1  -AN           Transfer Goal 1 (OT)    Activity/Assistive Device (Transfer Goal 1, OT)  toilet;shower chair with appropriate AD  -AN        [place] Level/Cues Needed (Transfer Goal 1, OT)  supervision required;set-up required  -AN        Time Frame (Transfer Goal 1, OT)  10 days  -AN        Progress/Outcome (Transfer Goal 1, OT)  goal ongoing  -AN           Bathing Goal 1 (OT)    Activity/Assistive Device (Bathing Goal 1, OT)  bathing skills, all;shower chair  -AN        [place] Level/Cues Needed (Bathing Goal 1, OT)  minimum assist (75% or more  patient effort)  -AN        Time Frame (Bathing Goal 1, OT)  10 days  -AN        Progress/Outcomes (Bathing Goal 1, OT)  goal ongoing  -AN           Dressing Goal 1 (OT)    Activity/Assistive Device (Dressing Goal 1, OT)  lower body dressing;sock-aid;reacher  -AN        [place]/Cues Needed (Dressing Goal 1, OT)  minimum assist (75% or more patient effort)  -AN        Time Frame (Dressing Goal 1, OT)  10 days  -AN        Progress/Outcome (Dressing Goal 1, OT)  goal ongoing  -AN           Living Environment    Home Accessibility  stairs to enter home  -AN          User Key  (r) = Recorded By, (t) = Taken By, (c) = Cosigned By    Initials Name Effective Dates    AN Sonya Rich, OT 06/22/15 -          Occupational Therapy Education     Title: PT OT SLP Therapies (In Progress)     Topic: Occupational Therapy (In Progress)     Point: ADL training (In Progress)     Description: Instruct learner(s) on proper safety adaptation and remediation techniques during self care or transfers.   Instruct in proper use of assistive devices.    Learning Progress Summary           Patient Acceptance, E,D, NR by TRENTON at 11/15/2019  8:10 AM    Comment:  Educated on current deficits, OT role.                               User Key     Initials Effective Dates Name Provider Type Discipline    AN 06/22/15 -  Sonya Rich, OT Occupational Therapist OT                  OT Recommendation and Plan  Outcome Summary/Treatment Plan (OT)  Anticipated Discharge Disposition (OT): home with 24/7 care, home with home health  Therapy Frequency (OT Eval): daily  Plan of Care Review  Plan of Care Reviewed With: patient  Plan of Care Reviewed With: patient  Outcome Summary: Pt with current evolving symptoms and PMH that has affected ADL I. Pt confused, unable to establish baseline Pt is CGA for txfrs, mod assist LB ADLs and tray set up. Continue OT.    Outcome Measures     Row Name 11/15/19 0810             How much help from another is currently  needed...    Putting on and taking off regular lower body clothing?  2  -AN      Bathing (including washing, rinsing, and drying)  2  -AN      Toileting (which includes using toilet bed pan or urinal)  2  -AN      Putting on and taking off regular upper body clothing  3  -AN      Taking care of personal grooming (such as brushing teeth)  3  -AN      Eating meals  3  -AN      AM-PAC 6 Clicks Score (OT)  15  -AN         Modified [place] Scale    Modified [place] Scale  4 - Moderately severe disability.  Unable to walk without assistance, and unable to attend to own bodily needs without assistance.  -AN         Functional Assessment    Outcome Measure Options  AM-PAC 6 Clicks Daily Activity (OT);Modified [place]  -AN        User Key  (r) = Recorded By, (t) = Taken By, (c) = Cosigned By    Initials Name Provider Type    Sonya Appiah OT Occupational Therapist          Time Calculation:   Time Calculation- OT     Row Name 11/15/19 0914             Time Calculation- OT    OT Start Time  0810  -AN      Total Timed Code Minutes- OT  0 minute(s)  -AN      OT Received On  11/15/19  -AN      OT Goal Re-Cert Due Date  11/25/19  -AN        User Key  (r) = Recorded By, (t) = Taken By, (c) = Cosigned By    Initials Name Provider Type    Sonya Appiah OT Occupational Therapist        Therapy Charges for Today     Code Description Service Date Service Provider Modifiers Qty    [id number]  OT EVAL LOW COMPLEXITY 4 11/15/2019 Sonya Rich OT GO 1               Sonya Rich OT  11/15/2019

## 2019-11-16 VITALS
DIASTOLIC BLOOD PRESSURE: 73 MMHG | HEART RATE: 96 BPM | OXYGEN SATURATION: 95 % | TEMPERATURE: 98.4 F | BODY MASS INDEX: 22.26 KG/M2 | HEIGHT: 69 IN | SYSTOLIC BLOOD PRESSURE: 141 MMHG | WEIGHT: 150.3 LBS | RESPIRATION RATE: 18 BRPM

## 2019-11-16 LAB
GLUCOSE BLDC GLUCOMTR-MCNC: 207 MG/DL (ref 70–130)
GLUCOSE BLDC GLUCOMTR-MCNC: 226 MG/DL (ref 70–130)

## 2019-11-16 PROCEDURE — 99217 PR OBSERVATION CARE DISCHARGE MANAGEMENT: CPT | Performed by: NURSE PRACTITIONER

## 2019-11-16 PROCEDURE — 63710000001 INSULIN LISPRO (HUMAN) PER 5 UNITS: Performed by: NURSE PRACTITIONER

## 2019-11-16 PROCEDURE — 82962 GLUCOSE BLOOD TEST: CPT

## 2019-11-16 PROCEDURE — G0378 HOSPITAL OBSERVATION PER HR: HCPCS

## 2019-11-16 RX ORDER — ASPIRIN 81 MG/1
81 TABLET, CHEWABLE ORAL DAILY
Qty: 30 TABLET | Refills: 0 | Status: SHIPPED | OUTPATIENT
Start: 2019-11-16 | End: 2020-04-29 | Stop reason: HOSPADM

## 2019-11-16 RX ORDER — LISINOPRIL 10 MG/1
5 TABLET ORAL DAILY
Start: 2019-11-16 | End: 2020-04-29 | Stop reason: HOSPADM

## 2019-11-16 RX ORDER — DONEPEZIL HYDROCHLORIDE 10 MG/1
10 TABLET, FILM COATED ORAL NIGHTLY
Qty: 30 TABLET | Refills: 0 | Status: ON HOLD | OUTPATIENT
Start: 2019-11-16 | End: 2020-04-21

## 2019-11-16 RX ADMIN — METOPROLOL TARTRATE 12.5 MG: 25 TABLET, FILM COATED ORAL at 08:13

## 2019-11-16 RX ADMIN — SODIUM CHLORIDE, PRESERVATIVE FREE 10 ML: 5 INJECTION INTRAVENOUS at 08:13

## 2019-11-16 RX ADMIN — INSULIN LISPRO 3 UNITS: 100 INJECTION, SOLUTION INTRAVENOUS; SUBCUTANEOUS at 08:19

## 2019-11-16 RX ADMIN — ASPIRIN 81 MG 81 MG: 81 TABLET ORAL at 08:13

## 2019-11-16 RX ADMIN — LISINOPRIL 5 MG: 5 TABLET ORAL at 08:13

## 2019-11-16 RX ADMIN — APIXABAN 5 MG: 5 TABLET, FILM COATED ORAL at 08:13

## 2019-11-16 NOTE — DISCHARGE SUMMARY
[facility] Medicine Services  DISCHARGE SUMMARY    Patient Name: Adriano Torres  : 1931  MRN: [id number]    Date of Admission: 2019  5:41 PM  Date of Discharge:  19  Primary Care Physician: Gilbert Mercer MD    Consults     Date and Time Order Name Status Description    11/15/2019 0030 Inpatient Neurology Consult Stroke Completed     2019 1733 Inpatient Neurology Consult Stroke Completed           Hospital Course     Presenting Problem:   Expressive aphasia [R47.01]    Active Hospital Problems    Diagnosis  POA   • **Suspected cerebrovascular accident (CVA) [R09.89]  Yes   • A-fib (CMS/[facility]) [I48.91]  Yes   • Expressive aphasia [R47.01]  Yes   • Occlusion of left carotid artery [I65.22]  Yes   • Cerebrovascular accident (CVA) due to embolism of left middle cerebral artery (CMS/[facility]) [I63.412]  Yes   • CKD (chronic kidney disease) stage 3, GFR 30-59 ml/min (CMS/[facility]) [N18.3]  Yes   • DM2 (diabetes mellitus, type 2) (CMS/HCC) [E11.9]  Yes   • HLD (hyperlipidemia) [E78.5]  Yes   • HTN (hypertension) [I10]  Yes   • Vascular dementia (CMS/[facility]) [F01.50]  Yes      Resolved Hospital Problems   No resolved problems to display.          Hospital Course:  Adriano Torres is a 88 y.o. male with complaint of weakness and slurred speech who was found to have concern for hypo-perfusion secondary to chronic left ICA occlusion.     Hypoperfusion with stroke-like symptoms  - likely secondary to chronic left ICA occlusion and low blood pressure in setting of hypoglycemia  - Neurology following, discussed with Dr. Vicente  --proximal MCA and ACAs are open, therefore he is not a candidate for intervention.   - did well with PT/OT  - decrease lisinopril dose, restart ASA, continue atorvastatin     Atrial fib   - Rate controlled on metoprolol  - continue Eliquis 5mg BID     Diabetes mellitus 2, complicated by hypoglycemia  - repeat A1c of 6.2, extremely tightly controlled DM for  "his age  - plan to discontinue Glipizide permanently and will leave Metformin discontinued at this time at [place] due to his recent hypoglycemic events  --follow up with PCP       Hyperlipidemia  - Continue high-dose statin     Hypertension  - continue metoprolol, cut lisinopril to 5mg daily     Vascular dementia  - Continue Aricept     CKD 3  -at baseline, monitor    Medically ready for discharge home but recommended having 24/7 assist at home, and has declined home health       Discharge Follow Up Recommendations for labs/diagnostics:   PCP in 1 week to discuss diabetic medications and monitor BP    Day of Discharge     HPI:   Resting in bed, NAD, no family present. No pain. Eager to go home. \"I have no complaints here, everyone has been great\". Denies f/c,n/v/d, soa or cp     Review of Systems  Otherwise ROS is negative except as mentioned in the HPI.    Vital Signs:   Temp:  [98.4 °F (36.9 °C)-99.7 °F (37.6 °C)] 98.4 °F (36.9 °C)  Heart Rate:  [] 96  Resp:  [18] 18  BP: (124-153)/(58-99) 141/73     Physical Exam:  Constitutional: Awake, alert  Eyes: PERRLA, sclerae anicteric, no conjunctival injection  HENT: NCAT, mucous membranes moist  Neck: Supple, no thyromegaly, no lymphadenopathy, trachea midline  Respiratory: Clear to auscultation bilaterally, nonlabored respirations   Cardiovascular: Irreg/ irreg, no murmurs, rubs, or gallops, palpable pedal pulses bilaterally  Gastrointestinal: Positive bowel sounds, soft, nontender, nondistended  Musculoskeletal: No bilateral ankle edema, no clubbing or cyanosis to extremities  Psychiatric: Appropriate affect, cooperative  Neurologic: Oriented x 3, strength symmetric in all extremities, Cranial Nerves grossly intact to confrontation, speech clear  Skin: No rashes     Pertinent  and/or Most Recent Results     Results from last 7 days   Lab Units 11/15/19  0625 11/14/19  1746 11/14/19  1742   WBC 10*3/mm3 6.42 7.32  --    HEMOGLOBIN g/dL 10.7* 11.7*  --    HEMOGLOBIN, " POC g/dL  --   --  12.9   HEMATOCRIT % 33.5* 36.6*  --    HEMATOCRIT POC %  --   --  38   PLATELETS 10*3/mm3 217 242  --    SODIUM mmol/L 136  --   --    POTASSIUM mmol/L 4.0  --   --    CHLORIDE mmol/L 103  --   --    CO2 mmol/L 22.0  --   --    BUN mg/dL 22  --   --    CREATININE mg/dL 1.30*  --  1.80*   GLUCOSE mg/dL 124*  --   --    CALCIUM mg/dL 8.2*  --   --      Results from last 7 days   Lab Units 11/14/19  1746 11/14/19  1745 11/14/19  1742   ALT (SGPT) U/L 17  --   --    AST (SGOT) U/L 24  --   --    PROTIME seconds  --   --  18.5*   INR   --   --  1.6*   APTT seconds  --  40.4*  --            Invalid input(s): TG, LDLCALC, LDLREALC  Results from last 7 days   Lab Units 11/15/19  0625 11/14/19 1746   HEMOGLOBIN A1C % 6.20*  --    TROPONIN T ng/mL  --  <0.010       Brief Urine Lab Results     None          Microbiology Results Abnormal     None          Imaging Results (All)     Procedure Component Value Units Date/Time    CT Angiogram Head [[id number]] Collected:  11/14/19 1902     Updated:  11/14/19 1916    Narrative:       EXAMINATION: CT ANGIOGRAM HEAD - 11/14/2019     INDICATION: Right-sided weakness, slurred speech.     TECHNIQUE: Pre- and postcontrast 3 mm and 0.75 mm unenhanced and  enhanced images through the brain with 2D angiographic reconstructions.     The radiation dose reduction device was turned on for each scan per the  ALARA (As Low as Reasonably Achievable) protocol.     COMPARISON: Cerebral perfusion study of same date. CTA neck of same  date.     FINDINGS: Note is again made of absent contrast in the distal left ICA,  with reconstitution of the supraclinoid ICA. Distal right ICA shows  moderate atherosclerotic disease but no evidence of high-grade stenosis.     Anterior cerebral arteries are symmetric and normal in size. Middle  cerebral arteries and proximal branches appear symmetric and normal.  Distal vertebral arteries are asymmetric with hypoplastic left vertebral  artery supplying  the cerebellum and normal-caliber right vertebral  artery continuing as the basilar artery. Basilar artery is relatively  small as there are bilateral posterior communicating arteries. No  significant right or left posterior cerebral artery stenosis is seen.       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 No evidence of significant intracranial  vascular stenosis is seen elsewhere.     DICTATED:   11/14/2019  EDITED/ls :   11/14/2019        CT Angiogram Neck [[id number]] Collected:  11/14/19 1855     Updated:  11/14/19 1914    Narrative:       EXAMINATION: CT ANGIOGRAM NECK - 11/14/2019     INDICATION: Right-sided weakness, slurred speech.      TECHNIQUE: Pre- and postcontrast 3 mm and 0.75 mm axial images through  the neck with 2D angiographic reconstructions of the carotid arteries.     The radiation dose reduction device was turned on for each scan per the  ALARA (As Low as Reasonably Achievable) protocol.     COMPARISON: None     FINDINGS: There is moderate narrowing of the proximal left subclavian  artery. Innominate and right subclavian appear grossly normal.     On the right, no significant common carotid stenosis is seen up to level  of the carotid bulb. There is eccentric calcification but probably only  mild right ICA origin stenosis. Beyond its origin, very focal,  approximately 50-70% narrowing, and then the ICA returns to normal  caliber. The right ECA appears grossly normal.     Left common carotid appears grossly normal-appearing up to the  bifurcation where there is fairly heavy circumferential calcification of  the carotid bulb. There is apparent occlusion of the left ICA just  beyond its origin. Left ECA appears relatively small but normal. These  images show the distal intracranial ICA to be reconstituted. Right  vertebral artery appears normal. Left vertebral artery is small but  normal.       Impression:       1. Occluded left ICA  just beyond its origin.  2. 50-70% right ICA origin stenosis.     DICTATED:   11/14/2019  EDITED/ls :   11/14/2019           CT Cerebral Perfusion With & Without Contrast [[id number]] Collected:  11/14/19 1758     Updated:  11/14/19 1914    Narrative:       EXAMINATION: CT CEREBRAL PERFUSION WWO CONTRAST - 11/14/2019     INDICATION: TIA, initial screening      TECHNIQUE: Cerebral perfusion analysis was performed using computed  tomography with contrast administration including postprocessing of  parametric maps with determination of cerebral blood flow, cerebral  blood volume, and mean transit time.     The radiation dose reduction device was turned on for each scan per the  ALARA (As Low as Reasonably Achievable) protocol.     COMPARISON: PET/CT scan of same date     FINDINGS: There is diffusely increased mean transit time and time to  drain to the left MCA territory. There is relatively mildly decreased  cerebral blood flow mostly through the superior portion of left MCA  territory and along the insular cortex. Cerebral blood volume, however,  appears normal.       Impression:       Slow flow throughout the left MCA territory and moderately  extensive ischemia. No evidence of significant core infarct.     DICTATED:   11/14/2019  EDITED/ls :   11/14/2019        XR Chest 1 View [[id number]] Collected:  11/14/19 1810     Updated:  11/14/19 1901    Narrative:          EXAMINATION: XR CHEST 1 VW - 11/14/2019      INDICATION: Stroke protocol.     COMPARISON: 09/24/2019     FINDINGS: Patchy dense interstitial disease in the left base and diffuse  interstitial disease elsewhere appear unchanged. No lung consolidation,  effusion or pneumothorax is seen. The heart is normal in size.           Impression:       Stable exam with diffuse interstitial disease, densest in  the left lung base. No clearly new or progressive chest pathology is  seen.     DICTATED:   11/14/2019  EDITED/ls :   11/14/2019           CT Head Without  Contrast Stroke Protocol [[id number]] Collected:  11/14/19 1754     Updated:  11/14/19 1759    Narrative:       EXAMINATION: CT HEAD WO CONTRAST STROKE PROTOCOL-      INDICATION: Stroke     TECHNIQUE: 5 mm unenhanced images through the brain     The radiation dose reduction device was turned on for each scan per the  ALARA (As Low as Reasonably Achievable) protocol.     COMPARISON: 09/24/2019     FINDINGS: The calvarium appears intact. Included paranasal sinuses and  mastoids appear clear. Soft tissue window images show expected  generalized cerebral atrophy for age. There is no evidence of  intracranial hemorrhage, mass, mass effect, infarct, hydrocephalus, or  abnormal extra-axial collection.             Impression:       Advanced generalized cerebral atrophy expected for age. No  evidence of acute intracranial disease.           Note: Exam time is shown as 5:32 PM. Study was dictated and signed at  5:56 PM.        This report was finalized on 11/14/2019 5:56 PM by DR. Joni Mitchell MD.             Results for orders placed during the hospital encounter of 09/23/19   Bilateral Carotid Duplex    Narrative · Right internal carotid artery stenosis of >70%.  · Left internal carotid artery stenosis totally occluded.  · Nominal antegrade bilateral vertebral artery flow demonstrated.  · Severity of CARMEN stenosis may be overestimated secondary to LICA   occlusion.          Results for orders placed during the hospital encounter of 09/23/19   Bilateral Carotid Duplex    Narrative · Right internal carotid artery stenosis of >70%.  · Left internal carotid artery stenosis totally occluded.  · Nominal antegrade bilateral vertebral artery flow demonstrated.  · Severity of CARMEN stenosis may be overestimated secondary to LICA   occlusion.          Results for orders placed during the hospital encounter of 09/23/19   Adult Transthoracic Echo Complete W/ Cont if Necessary Per Protocol (With Agitated Saline)    Narrative · Left  ventricular wall thickness is consistent with mild concentric   hypertrophy.  · Estimated EF = 58%.  · Left ventricular systolic function is normal.  · Left atrial cavity size is mildly dilated.  · Normal right ventricular cavity size, wall thickness, systolic function   and septal motion noted.  · Saline test results are negative.  · The aortic valve exhibits mild sclerosis.  · Mild MAC is present.  · No evidence of pulmonary hypertension is present.  · There is no evidence of pericardial effusion.            Discharge Details        Discharge Medications      New Medications      Instructions Start Date   aspirin 81 MG chewable tablet   81 mg, Oral, Daily         Changes to Medications      Instructions Start Date   donepezil 10 MG tablet  Commonly known as:  ARICEPT  What changed:    · medication strength  · how much to take   10 mg, Oral, Nightly      lisinopril 10 MG tablet  Commonly known as:  PRINIVIL,ZESTRIL  What changed:  how much to take   5 mg, Oral, Daily         Continue These Medications      Instructions Start Date   apixaban 5 MG tablet tablet  Commonly known as:  ELIQUIS   5 mg, Oral, Every 12 Hours Scheduled      atorvastatin 80 MG tablet  Commonly known as:  LIPITOR   80 mg, Oral, Nightly      loratadine 10 MG tablet  Commonly known as:  CLARITIN   10 mg, Oral, Daily      metoprolol tartrate 25 MG tablet  Commonly known as:  LOPRESSOR   12.5 mg, Oral, Every 12 Hours Scheduled         Stop These Medications    glipizide 10 MG tablet  Commonly known as:  GLUCOTROL     metFORMIN 500 MG tablet  Commonly known as:  GLUCOPHAGE            Allergies   Allergen Reactions   • Pollen Extract Unknown (See Comments)     rhinitis         Discharge Disposition:  Home or Self Care    Diet:  Hospital:  Diet Order   Procedures   • Diet Regular; Consistent Carbohydrate       Activity:  Activity Instructions     Activity as Tolerated               CODE STATUS:    Code Status and Medical Interventions:   Ordered at:  11/14/19 2021     Code Status:    CPR     Medical Interventions (Level of Support Prior to Arrest):    Full       Future Appointments   Date Time Provider Department Center   5/22/2020 10:45 AM Mahesh Peterson MD [facility] [place] None       Additional Instructions for the Follow-ups that You Need to Schedule     Discharge Follow-up with PCP   As directed       Currently Documented PCP:    Gilbert Mercer MD    PCP Phone Number:    [phone]     Follow Up Details:  1 week               Time Spent on Discharge:  40 minutes    Electronically signed by JOAO Bustillos, 11/16/19, 10:14 AM.

## 2019-11-16 NOTE — NURSING NOTE
Patient an d family education performed. Patient and family instructed to continue to take blood sugars at home, record, and take to PCP on Monday as per discharge instructions.

## 2019-11-17 ENCOUNTER — READMISSION MANAGEMENT (OUTPATIENT)
Dept: CALL CENTER | Facility: HOSPITAL | Age: 84
End: 2019-11-17

## 2019-11-17 NOTE — OUTREACH NOTE
Prep Survey      Responses   Facility patient discharged from?  [facility]   Is patient eligible?  Yes   Discharge diagnosis  Transient ischemic attack secondary to hypoglycemia   Does the patient have one of the following disease processes/diagnoses(primary or secondary)?  Stroke (TIA)   Does the patient have Home health ordered?  No   Is there a DME ordered?  No   Prep survey completed?  Yes          Ayse Valdovinos RN

## 2019-11-18 RX ORDER — ATORVASTATIN CALCIUM 80 MG/1
TABLET, FILM COATED ORAL
Qty: 90 TABLET | Refills: 3 | Status: SHIPPED | OUTPATIENT
Start: 2019-11-18 | End: 2020-04-29 | Stop reason: HOSPADM

## 2019-11-19 ENCOUNTER — READMISSION MANAGEMENT (OUTPATIENT)
Dept: CALL CENTER | Facility: HOSPITAL | Age: 84
End: 2019-11-19

## 2019-11-19 NOTE — OUTREACH NOTE
Stroke Week 1 Survey      Responses   Facility patient discharged from?  [facility]   Does the patient have one of the following disease processes/diagnoses(primary or secondary)?  Stroke (TIA)   Is there a successful TCM telephone encounter documented?  No   Week 1 attempt successful?  Yes   Call start time  1714   Call end time  1716   Discharge diagnosis  Transient ischemic attack secondary to hypoglycemia   Is patient permission given to speak with other caregiver?  Yes   List who call center can speak with  [place]   Person spoke with today (if not patient) and relationship  [place] Daughter   [phone]    Meds reviewed with patient/caregiver?  Yes   Is the patient having any side effects they believe may be caused by any medication additions or changes?  No   Does the patient have all medications ordered at discharge?  Yes   Is the patient taking all medications as directed (includes completed medication regime)?  Yes   Medication comments  stopped glipizide   Does the patient have a primary care provider?   Yes   Does the patient have an appointment with their PCP within 7 days of discharge?  Yes   Comments regarding PCP  Daughter states pt has seen PCP and report appt went well   Has the patient kept scheduled appointments due by today?  Yes   Has home health visited the patient within 72 hours of discharge?  N/A   Psychosocial issues?  No   Does the patient require any assistance with activities of daily living such as eating, bathing, dressing, walking, etc.?  No   Does the patient have any residual symptoms from stroke/TIA?  No   Does the patient understand the diet ordered at discharge?  Yes   Did the patient receive a copy of their discharge instructions?  Yes   Nursing interventions  Reviewed instructions with patient   What is the patient's perception of their health status since discharge?  Returned to baseline/stable   Nursing interventions  Nurse provided patient education   Is the patient able to  teach back FAST for Stroke?  Yes   Is the patient/caregiver able to teach back the risk factors for a stroke?  Diabetes, High blood pressure-goal below 120/80, Carotid or other artery disease   Is the patient/caregiver able to teach back signs and symptoms related to disease process for when to call PCP?  Yes   Is the patient/caregiver able to teach back signs and symptoms related to disease process for when to call 911?  Yes   Is the patient/caregiver able to teach back the hierarchy of who to call/visit for symptoms/problems? PCP, Specialist, Home health nurse, Urgent Care, ED, 911  Yes   Week 1 call completed?  Yes          Iveth Sylvester RN

## 2019-11-26 ENCOUNTER — APPOINTMENT (OUTPATIENT)
Dept: CT IMAGING | Facility: HOSPITAL | Age: 84
End: 2019-11-26

## 2019-11-26 ENCOUNTER — READMISSION MANAGEMENT (OUTPATIENT)
Dept: CALL CENTER | Facility: HOSPITAL | Age: 84
End: 2019-11-26

## 2019-11-26 ENCOUNTER — HOSPITAL ENCOUNTER (EMERGENCY)
Facility: HOSPITAL | Age: 84
Discharge: HOME | End: 2019-11-26
Attending: EMERGENCY MEDICINE | Admitting: EMERGENCY MEDICINE

## 2019-11-26 VITALS
WEIGHT: 152.6 LBS | RESPIRATION RATE: 18 BRPM | HEIGHT: 67 IN | OXYGEN SATURATION: 96 % | HEART RATE: 57 BPM | BODY MASS INDEX: 23.95 KG/M2 | SYSTOLIC BLOOD PRESSURE: 146 MMHG | DIASTOLIC BLOOD PRESSURE: 84 MMHG | TEMPERATURE: 97.5 F

## 2019-11-26 DIAGNOSIS — S20.212A RIB CONTUSION, LEFT, INITIAL ENCOUNTER: Primary | ICD-10-CM

## 2019-11-26 PROCEDURE — 99283 EMERGENCY DEPT VISIT LOW MDM: CPT

## 2019-11-26 PROCEDURE — 71250 CT THORAX DX C-: CPT

## 2019-11-26 RX ORDER — HYDROCODONE BITARTRATE AND ACETAMINOPHEN 5; 325 MG/1; MG/1
1 TABLET ORAL EVERY 6 HOURS PRN
Qty: 10 TABLET | Refills: 0 | Status: ON HOLD | OUTPATIENT
Start: 2019-11-26 | End: 2020-04-21

## 2019-11-26 RX ORDER — HYDROCODONE BITARTRATE AND ACETAMINOPHEN 5; 325 MG/1; MG/1
1 TABLET ORAL ONCE
Status: COMPLETED | OUTPATIENT
Start: 2019-11-26 | End: 2019-11-26

## 2019-11-26 RX ADMIN — HYDROCODONE BITARTRATE AND ACETAMINOPHEN 1 TABLET: 5; 325 TABLET ORAL at 15:43

## 2019-11-26 NOTE — OUTREACH NOTE
Stroke Week 2 Survey      Responses   Facility patient discharged from?  [facility]   Does the patient have one of the following disease processes/diagnoses(primary or secondary)?  Stroke (TIA)   Week 2 attempt successful?  Yes   Call start time  1436   Call end time  1437   Discharge diagnosis  Transient ischemic attack secondary to hypoglycemia   Meds reviewed with patient/caregiver?  Yes   Is the patient having any side effects they believe may be caused by any medication additions or changes?  No   Does the patient have all medications ordered at discharge?  Yes   Is the patient taking all medications as directed (includes completed medication regime)?  Yes   Does the patient have a primary care provider?   Yes   Does the patient have an appointment with their PCP within 7 days of discharge?  Yes   Has the patient kept scheduled appointments due by today?  Yes   Psychosocial issues?  No   Does the patient require any assistance with activities of daily living such as eating, bathing, dressing, walking, etc.?  No   Does the patient have any residual symptoms from stroke/TIA?  No   Does the patient understand the diet ordered at discharge?  Yes   Did the patient receive a copy of their discharge instructions?  Yes   Nursing interventions  Reviewed instructions with patient   What is the patient's perception of their health status since discharge?  Same   Nursing interventions  Nurse provided patient education   Is the patient able to teach back FAST for Stroke?  Yes   Is the patient/caregiver able to teach back the risk factors for a stroke?  Diabetes, High blood pressure-goal below 120/80, Carotid or other artery disease   Is the patient/caregiver able to teach back signs and symptoms related to disease process for when to call PCP?  Yes   Is the patient/caregiver able to teach back signs and symptoms related to disease process for when to call 911?  Yes   Is the patient/caregiver able to teach back the hierarchy  of who to call/visit for symptoms/problems? PCP, Specialist, Home health nurse, Urgent Care, ED, 911  Yes   Week 2 call completed?  Yes   Wrap up additional comments  daughter in a hurry, stated everything was going well.           Antonieta Dodd, RN

## 2019-11-30 ENCOUNTER — HOSPITAL ENCOUNTER (EMERGENCY)
Facility: HOSPITAL | Age: 84
Discharge: HOME | End: 2019-11-30
Attending: EMERGENCY MEDICINE | Admitting: EMERGENCY MEDICINE

## 2019-11-30 ENCOUNTER — NURSE TRIAGE (OUTPATIENT)
Dept: CALL CENTER | Facility: HOSPITAL | Age: 84
End: 2019-11-30

## 2019-11-30 ENCOUNTER — APPOINTMENT (OUTPATIENT)
Dept: GENERAL RADIOLOGY | Facility: HOSPITAL | Age: 84
End: 2019-11-30

## 2019-11-30 VITALS
DIASTOLIC BLOOD PRESSURE: 78 MMHG | BODY MASS INDEX: 24.59 KG/M2 | SYSTOLIC BLOOD PRESSURE: 149 MMHG | RESPIRATION RATE: 20 BRPM | OXYGEN SATURATION: 98 % | WEIGHT: 157 LBS | TEMPERATURE: 97.5 F | HEART RATE: 73 BPM

## 2019-11-30 DIAGNOSIS — S22.42XA CLOSED FRACTURE OF MULTIPLE RIBS OF LEFT SIDE, INITIAL ENCOUNTER: ICD-10-CM

## 2019-11-30 DIAGNOSIS — E86.0 DEHYDRATION: ICD-10-CM

## 2019-11-30 DIAGNOSIS — I48.92 ATRIAL FLUTTER, UNSPECIFIED TYPE (HCC): Primary | ICD-10-CM

## 2019-11-30 LAB
ALBUMIN SERPL-MCNC: 3.6 G/DL (ref 3.5–5.2)
ALBUMIN/GLOB SERPL: 0.9 G/DL
ALP SERPL-CCNC: 86 U/L (ref 39–117)
ALT SERPL W P-5'-P-CCNC: 13 U/L (ref 1–41)
ANION GAP SERPL CALCULATED.3IONS-SCNC: 15.1 MMOL/L (ref 5–15)
AST SERPL-CCNC: 21 U/L (ref 1–40)
BASOPHILS # BLD AUTO: 0.04 10*3/MM3 (ref 0–0.2)
BASOPHILS NFR BLD AUTO: 0.5 % (ref 0–1.5)
BILIRUB SERPL-MCNC: 0.6 MG/DL (ref 0.2–1.2)
BUN BLD-MCNC: 34 MG/DL (ref 8–23)
BUN/CREAT SERPL: 20.7 (ref 7–25)
CALCIUM SPEC-SCNC: 9.2 MG/DL (ref 8.6–10.5)
CHLORIDE SERPL-SCNC: 96 MMOL/L (ref 98–107)
CO2 SERPL-SCNC: 21.9 MMOL/L (ref 22–29)
CREAT BLD-MCNC: 1.64 MG/DL (ref 0.76–1.27)
DEPRECATED RDW RBC AUTO: 51.3 FL (ref 37–54)
EOSINOPHIL # BLD AUTO: 0.17 10*3/MM3 (ref 0–0.4)
EOSINOPHIL NFR BLD AUTO: 1.9 % (ref 0.3–6.2)
ERYTHROCYTE [DISTWIDTH] IN BLOOD BY AUTOMATED COUNT: 14.4 % (ref 12.3–15.4)
GFR SERPL CREATININE-BSD FRML MDRD: 40 ML/MIN/1.73
GLOBULIN UR ELPH-MCNC: 4 GM/DL
GLUCOSE BLD-MCNC: 168 MG/DL (ref 65–99)
HCT VFR BLD AUTO: 35.4 % (ref 37.5–51)
HGB BLD-MCNC: 11.4 G/DL (ref 13–17.7)
HOLD SPECIMEN: NORMAL
HOLD SPECIMEN: NORMAL
IMM GRANULOCYTES # BLD AUTO: 0.04 10*3/MM3 (ref 0–0.05)
IMM GRANULOCYTES NFR BLD AUTO: 0.5 % (ref 0–0.5)
LYMPHOCYTES # BLD AUTO: 1.57 10*3/MM3 (ref 0.7–3.1)
LYMPHOCYTES NFR BLD AUTO: 18 % (ref 19.6–45.3)
MCH RBC QN AUTO: 31.3 PG (ref 26.6–33)
MCHC RBC AUTO-ENTMCNC: 32.2 G/DL (ref 31.5–35.7)
MCV RBC AUTO: 97.3 FL (ref 79–97)
MONOCYTES # BLD AUTO: 0.79 10*3/MM3 (ref 0.1–0.9)
MONOCYTES NFR BLD AUTO: 9.1 % (ref 5–12)
NEUTROPHILS # BLD AUTO: 6.11 10*3/MM3 (ref 1.7–7)
NEUTROPHILS NFR BLD AUTO: 70 % (ref 42.7–76)
NRBC BLD AUTO-RTO: 0 /100 WBC (ref 0–0.2)
NT-PROBNP SERPL-MCNC: 3507 PG/ML (ref 5–1800)
PLATELET # BLD AUTO: 282 10*3/MM3 (ref 140–450)
PMV BLD AUTO: 9.8 FL (ref 6–12)
POTASSIUM BLD-SCNC: 4.8 MMOL/L (ref 3.5–5.2)
PROT SERPL-MCNC: 7.6 G/DL (ref 6–8.5)
RBC # BLD AUTO: 3.64 10*6/MM3 (ref 4.14–5.8)
SODIUM BLD-SCNC: 133 MMOL/L (ref 136–145)
TROPONIN T SERPL-MCNC: 0.02 NG/ML (ref 0–0.03)
WBC NRBC COR # BLD: 8.72 10*3/MM3 (ref 3.4–10.8)
WHOLE BLOOD HOLD SPECIMEN: NORMAL
WHOLE BLOOD HOLD SPECIMEN: NORMAL

## 2019-11-30 PROCEDURE — 84484 ASSAY OF TROPONIN QUANT: CPT | Performed by: EMERGENCY MEDICINE

## 2019-11-30 PROCEDURE — 99284 EMERGENCY DEPT VISIT MOD MDM: CPT

## 2019-11-30 PROCEDURE — 71045 X-RAY EXAM CHEST 1 VIEW: CPT

## 2019-11-30 PROCEDURE — 96360 HYDRATION IV INFUSION INIT: CPT

## 2019-11-30 PROCEDURE — 80053 COMPREHEN METABOLIC PANEL: CPT | Performed by: EMERGENCY MEDICINE

## 2019-11-30 PROCEDURE — 93005 ELECTROCARDIOGRAM TRACING: CPT | Performed by: EMERGENCY MEDICINE

## 2019-11-30 PROCEDURE — 83880 ASSAY OF NATRIURETIC PEPTIDE: CPT | Performed by: EMERGENCY MEDICINE

## 2019-11-30 PROCEDURE — 85025 COMPLETE CBC W/AUTO DIFF WBC: CPT | Performed by: EMERGENCY MEDICINE

## 2019-11-30 RX ORDER — SODIUM CHLORIDE 0.9 % (FLUSH) 0.9 %
10 SYRINGE (ML) INJECTION AS NEEDED
Status: DISCONTINUED | OUTPATIENT
Start: 2019-11-30 | End: 2019-11-30 | Stop reason: HOSPADM

## 2019-11-30 RX ADMIN — SODIUM CHLORIDE 500 ML: 9 INJECTION, SOLUTION INTRAVENOUS at 12:11

## 2019-11-30 NOTE — TELEPHONE ENCOUNTER
"His pulse rate is 121, bp 108/60. He is laying around and talking out of his head. No complaints of chest pain. He has eat and drank and went to the bathroom.     Reason for Disposition  • Patient sounds very sick or weak to the triager    Additional Information  • Negative: Passed out (i.e., lost consciousness, collapsed and was not responding)  • Negative: Shock suspected (e.g., cold/pale/clammy skin, too weak to stand, low BP, rapid pulse)  • Negative: Difficult to awaken or acting confused (e.g., disoriented, slurred speech)  • Negative: Visible sweat on face or sweat dripping down face  • Negative: Unable to walk, or can only walk with assistance (e.g., requires support)  • Negative: [1] Received SHOCK from implantable cardiac defibrillator AND [2] persisting symptoms (i.e., palpitations, lightheadedness)  • Negative: Sounds like a life-threatening emergency to the triager  • Negative: Chest pain  • Negative: Difficulty breathing  • Negative: Dizziness, lightheadedness, or weakness  • Negative: [1] Heart beating very rapidly (e.g., > 140 / minute) AND [2] present now  (Exception: during exercise)  • Negative: Heart beating very slowly (e.g., < 50 / minute)  (Exception: athlete)  • Negative: New or worsened shortness of breath with activity (dyspnea on exertion)    Answer Assessment - Initial Assessment Questions  1. DESCRIPTION: \"Please describe your heart rate or heart beat that you are having\" (e.g., fast/slow, regular/irregular, skipped or extra beats, \"palpitations\")      His heart rate is 121.   2. ONSET: \"When did it start?\" (Minutes, hours or days)       She noticed it today  3. DURATION: \"How long does it last\" (e.g., seconds, minutes, hours)      She is unsure   4. PATTERN \"Does it come and go, or has it been constant since it started?\"  \"Does it get worse with exertion?\"   \"Are you feeling it now?\"     He is unable to communicate this.   5. TAP: \"Using your hand, can you tap out what you are feeling " "on a chair or table in front of you, so that I can hear?\" (Note: not all patients can do this)        He is unable to do this.   6. HEART RATE: \"Can you tell me your heart rate?\" \"How many beats in 15 seconds?\"  (Note: not all patients can do this)        120  7. RECURRENT SYMPTOM: \"Have you ever had this before?\" If so, ask: \"When was the last time?\" and \"What happened that time?\"       no  8. CAUSE: \"What do you think is causing the palpitations?\"      They do not know   9. CARDIAC HISTORY: \"Do you have any history of heart disease?\" (e.g., heart attack, angina, bypass surgery, angioplasty, arrhythmia)       n  10. OTHER SYMPTOMS: \"Do you have any other symptoms?\" (e.g., dizziness, chest pain, sweating, difficulty breathing)        Talking out of his head.   11. PREGNANCY: \"Is there any chance you are pregnant?\" \"When was your last menstrual period?\"        n    Protocols used: HEART RATE AND HEARTBEAT QUESTIONS-ADULT-AH      "

## 2019-12-02 ENCOUNTER — NURSE TRIAGE (OUTPATIENT)
Dept: CALL CENTER | Facility: HOSPITAL | Age: 84
End: 2019-12-02

## 2019-12-02 NOTE — TELEPHONE ENCOUNTER
Caller states b/p 150/102 and 150/100 with heart rate 70's. She states just gave night medication. She state she is keeping a log and was told to recheck b/p in two hour's. She was advised call back if needed as well as contact PCP in the morning for possible medication adjustment. She was educated on what b/p should be on recheck.She was educated on when to see emergent care. She denies any other symptom but state was having pain where he broke rib and that she had not given pain medication since this morning. She is also going to give him pain medication.     Reason for Disposition  • Systolic BP  >= 160 OR Diastolic >= 100    Additional Information  • Negative: Difficult to awaken or acting confused (e.g., disoriented, slurred speech)  • Negative: Severe difficulty breathing (e.g., struggling for each breath, speaks in single words)  • Negative: [1] Weakness of the face, arm or leg on one side of the body AND [2] new onset  • Negative: [1] Numbness (i.e., loss of sensation) of the face, arm or leg on one side of the body AND [2] new onset  • Negative: [1] Chest pain lasts > 5 minutes AND [2] history of heart disease  (i.e., heart attack, bypass surgery, angina, angioplasty, CHF)  • Negative: [1] Chest pain AND [2] took nitrogylcerin AND [3] pain was not relieved  • Negative: Sounds like a life-threatening emergency to the triager  • Negative: Symptom is main concern  (e.g., headache, chest pain)  • Negative: Low blood pressure is main concern  • Negative: [1] Systolic BP  >= 160 OR Diastolic >= 100 AND [2] cardiac or neurologic symptoms (e.g., chest pain, difficulty breathing, unsteady gait, blurred vision)  • Negative: [1] Pregnant > 20 weeks AND [2] new hand or face swelling  • Negative: [1] Pregnant > 20 weeks AND [2] BP Systolic BP  >= 140 OR Diastolic >= 90  • Negative: [1] Systolic BP  >= 200 OR Diastolic >= 120  AND [2] having NO cardiac or neurologic symptoms  • Negative: [1] Systolic BP  >= 180 OR  "Diastolic >= 110 AND [2] missed most recent dose of blood pressure medication  • Negative: Systolic BP  >= 180 OR Diastolic >= 110  • Negative: Ran out of BP medications    Answer Assessment - Initial Assessment Questions  1. BLOOD PRESSURE: \"What is the blood pressure?\" \"Did you take at least two measurements 5 minutes apart?\"      150/102, 150/100  2. ONSET: \"When did you take your blood pressure?\"       Thirty minutes ago   3. HOW: \"How did you obtain the blood pressure?\" (e.g., visiting nurse, automatic home BP monitor)      Automatic monitor   4. HISTORY: \"Do you have a history of high blood pressure?\"      Yes   5. MEDICATIONS: \"Are you taking any medications for blood pressure?\" \"Have you missed any doses recently?\"      Takes medication and no missed dose   6. OTHER SYMPTOMS: \"Do you have any symptoms?\" (e.g., headache, chest pain, blurred vision, difficulty breathing, weakness)      Denies   7. PREGNANCY: \"Is there any chance you are pregnant?\" \"When was your last menstrual period?\"      N/a    Protocols used: HIGH BLOOD PRESSURE-ADULT-AH      "

## 2019-12-03 ENCOUNTER — READMISSION MANAGEMENT (OUTPATIENT)
Dept: CALL CENTER | Facility: HOSPITAL | Age: 84
End: 2019-12-03

## 2019-12-03 NOTE — OUTREACH NOTE
Stroke Week 3 Survey      Responses   Facility patient discharged from?  [facility]   Does the patient have one of the following disease processes/diagnoses(primary or secondary)?  Stroke (TIA)   Week 3 attempt successful?  Yes   Call start time  1447   Call end time  1451   Discharge diagnosis  Transient ischemic attack secondary to hypoglycemia   Person spoke with today (if not patient) and relationship  [place] Daughter   [phone]    Meds reviewed with patient/caregiver?  Yes   Is the patient taking all medications as directed (includes completed medication regime)?  Yes   Has the patient kept scheduled appointments due by today?  Yes   Psychosocial issues?  No   Does the patient require any assistance with activities of daily living such as eating, bathing, dressing, walking, etc.?  No   Does the patient have any residual symptoms from stroke/TIA?  No   Does the patient understand the diet ordered at discharge?  Yes   What is the patient's perception of their health status since discharge?  Improving [Pt had a fall recently and cracked some ribs]   Nursing interventions  Nurse provided patient education   Is the patient able to teach back FAST for Stroke?  Yes   Is the patient/caregiver able to teach back the risk factors for a stroke?  History of TIAs, Smoking, Diabetes, High blood pressure-goal below 120/80, High Cholesterol, Sleep apnea [nonsmoker, diabetic-blood glucose runs between 153-193, no sleep apnea]   If the patient is a current smoker, are they able to teach back resources for cessation?  -- [nonsmoker]   Week 3 call completed?  Yes          Carla Montemayor RN

## 2019-12-11 ENCOUNTER — READMISSION MANAGEMENT (OUTPATIENT)
Dept: CALL CENTER | Facility: HOSPITAL | Age: 84
End: 2019-12-11

## 2019-12-11 NOTE — OUTREACH NOTE
Stroke Week 4 Survey      Responses   Facility patient discharged from?  [facility]   Does the patient have one of the following disease processes/diagnoses(primary or secondary)?  Stroke (TIA)   Week 4 attempt successful?  Yes   Call start time  0914   Call end time  0916   Discharge diagnosis  Transient ischemic attack secondary to hypoglycemia   Person spoke with today (if not patient) and relationship  [place] Daughter   [phone]    Meds reviewed with patient/caregiver?  Yes   Is the patient having any side effects they believe may be caused by any medication additions or changes?  No   Is the patient taking all medications as directed (includes completed medication regime)?  Yes   Has the patient kept scheduled appointments due by today?  Yes   Is the patient still receiving Home Health Services?  N/A   Psychosocial issues?  No   Does the patient require any assistance with activities of daily living such as eating, bathing, dressing, walking, etc.?  No   Does the patient have any residual symptoms from stroke/TIA?  Yes   Does the patient understand the diet ordered at discharge?  Yes   What is the patient's perception of their health status since discharge?  Improving   Nursing interventions  Nurse provided patient education   Is the patient able to teach back FAST for Stroke?  Yes   Is the patient/caregiver able to teach back the risk factors for a stroke?  History of TIAs, Smoking, Diabetes, High blood pressure-goal below 120/80, High Cholesterol, Sleep apnea   Is the patient/caregiver able to teach back signs and symptoms related to disease process for when to call PCP?  Yes   Is the patient/caregiver able to teach back signs and symptoms related to disease process for when to call 911?  Yes   Is the patient/caregiver able to teach back the hierarchy of who to call/visit for symptoms/problems? PCP, Specialist, Home health nurse, Urgent Care, ED, 911  Yes   Week 4 Call Completed?  Yes   Would the patient  like one additional call?  No   Graduated  Yes   Did the patient feel the follow up calls were helpful during their recovery period?  Yes   Was the number of calls appropriate?  Yes   Wrap up additional comments  daughter in a hurry, stated everything was going well.           Antonieta Dodd RN

## 2019-12-20 ENCOUNTER — APPOINTMENT (OUTPATIENT)
Dept: CT IMAGING | Facility: HOSPITAL | Age: 84
End: 2019-12-20

## 2019-12-20 ENCOUNTER — APPOINTMENT (OUTPATIENT)
Dept: GENERAL RADIOLOGY | Facility: HOSPITAL | Age: 84
End: 2019-12-20

## 2019-12-20 ENCOUNTER — HOSPITAL ENCOUNTER (EMERGENCY)
Facility: HOSPITAL | Age: 84
Discharge: HOME | End: 2019-12-20
Attending: EMERGENCY MEDICINE | Admitting: EMERGENCY MEDICINE

## 2019-12-20 VITALS
SYSTOLIC BLOOD PRESSURE: 135 MMHG | DIASTOLIC BLOOD PRESSURE: 67 MMHG | TEMPERATURE: 97.8 F | WEIGHT: 153 LBS | RESPIRATION RATE: 16 BRPM | HEIGHT: 69 IN | HEART RATE: 75 BPM | OXYGEN SATURATION: 100 % | BODY MASS INDEX: 22.66 KG/M2

## 2019-12-20 DIAGNOSIS — N18.9 ACUTE ON CHRONIC RENAL INSUFFICIENCY: ICD-10-CM

## 2019-12-20 DIAGNOSIS — N28.9 ACUTE ON CHRONIC RENAL INSUFFICIENCY: ICD-10-CM

## 2019-12-20 DIAGNOSIS — E86.0 MILD DEHYDRATION: Primary | ICD-10-CM

## 2019-12-20 DIAGNOSIS — R79.89 ABNORMAL TSH: ICD-10-CM

## 2019-12-20 LAB
ALBUMIN SERPL-MCNC: 4.1 G/DL (ref 3.5–5.2)
ALBUMIN/GLOB SERPL: 1.2 G/DL
ALP SERPL-CCNC: 103 U/L (ref 39–117)
ALT SERPL W P-5'-P-CCNC: 15 U/L (ref 1–41)
ANION GAP SERPL CALCULATED.3IONS-SCNC: 15.4 MMOL/L (ref 5–15)
AST SERPL-CCNC: 19 U/L (ref 1–40)
BASOPHILS # BLD AUTO: 0.03 10*3/MM3 (ref 0–0.2)
BASOPHILS NFR BLD AUTO: 0.4 % (ref 0–1.5)
BILIRUB SERPL-MCNC: 0.4 MG/DL (ref 0.2–1.2)
BILIRUB UR QL STRIP: NEGATIVE
BUN BLD-MCNC: 45 MG/DL (ref 8–23)
BUN/CREAT SERPL: 23.6 (ref 7–25)
CALCIUM SPEC-SCNC: 9.2 MG/DL (ref 8.6–10.5)
CHLORIDE SERPL-SCNC: 96 MMOL/L (ref 98–107)
CLARITY UR: CLEAR
CO2 SERPL-SCNC: 23.6 MMOL/L (ref 22–29)
COLOR UR: YELLOW
CREAT BLD-MCNC: 1.91 MG/DL (ref 0.76–1.27)
DEPRECATED RDW RBC AUTO: 54.6 FL (ref 37–54)
EOSINOPHIL # BLD AUTO: 0.25 10*3/MM3 (ref 0–0.4)
EOSINOPHIL NFR BLD AUTO: 3.2 % (ref 0.3–6.2)
ERYTHROCYTE [DISTWIDTH] IN BLOOD BY AUTOMATED COUNT: 15.1 % (ref 12.3–15.4)
FLUAV AG NPH QL: NEGATIVE
FLUBV AG NPH QL IA: NEGATIVE
GFR SERPL CREATININE-BSD FRML MDRD: 33 ML/MIN/1.73
GLOBULIN UR ELPH-MCNC: 3.4 GM/DL
GLUCOSE BLD-MCNC: 176 MG/DL (ref 65–99)
GLUCOSE UR STRIP-MCNC: NEGATIVE MG/DL
HCT VFR BLD AUTO: 33.5 % (ref 37.5–51)
HGB BLD-MCNC: 10.8 G/DL (ref 13–17.7)
HGB UR QL STRIP.AUTO: NEGATIVE
HOLD SPECIMEN: NORMAL
HOLD SPECIMEN: NORMAL
IMM GRANULOCYTES # BLD AUTO: 0.03 10*3/MM3 (ref 0–0.05)
IMM GRANULOCYTES NFR BLD AUTO: 0.4 % (ref 0–0.5)
KETONES UR QL STRIP: NEGATIVE
LEUKOCYTE ESTERASE UR QL STRIP.AUTO: NEGATIVE
LYMPHOCYTES # BLD AUTO: 1.52 10*3/MM3 (ref 0.7–3.1)
LYMPHOCYTES NFR BLD AUTO: 19.5 % (ref 19.6–45.3)
MCH RBC QN AUTO: 31.6 PG (ref 26.6–33)
MCHC RBC AUTO-ENTMCNC: 32.2 G/DL (ref 31.5–35.7)
MCV RBC AUTO: 98 FL (ref 79–97)
MONOCYTES # BLD AUTO: 0.56 10*3/MM3 (ref 0.1–0.9)
MONOCYTES NFR BLD AUTO: 7.2 % (ref 5–12)
NEUTROPHILS # BLD AUTO: 5.4 10*3/MM3 (ref 1.7–7)
NEUTROPHILS NFR BLD AUTO: 69.3 % (ref 42.7–76)
NITRITE UR QL STRIP: NEGATIVE
NRBC BLD AUTO-RTO: 0 /100 WBC (ref 0–0.2)
PH UR STRIP.AUTO: <=5 [PH] (ref 5–8)
PLATELET # BLD AUTO: 234 10*3/MM3 (ref 140–450)
PMV BLD AUTO: 9.9 FL (ref 6–12)
POTASSIUM BLD-SCNC: 4.8 MMOL/L (ref 3.5–5.2)
PROT SERPL-MCNC: 7.5 G/DL (ref 6–8.5)
PROT UR QL STRIP: NEGATIVE
RBC # BLD AUTO: 3.42 10*6/MM3 (ref 4.14–5.8)
SODIUM BLD-SCNC: 135 MMOL/L (ref 136–145)
SP GR UR STRIP: 1.02 (ref 1–1.03)
TROPONIN T SERPL-MCNC: 0.02 NG/ML (ref 0–0.03)
TSH SERPL DL<=0.05 MIU/L-ACNC: 8.38 UIU/ML (ref 0.27–4.2)
UROBILINOGEN UR QL STRIP: NORMAL
WBC NRBC COR # BLD: 7.79 10*3/MM3 (ref 3.4–10.8)
WHOLE BLOOD HOLD SPECIMEN: NORMAL
WHOLE BLOOD HOLD SPECIMEN: NORMAL

## 2019-12-20 PROCEDURE — 85025 COMPLETE CBC W/AUTO DIFF WBC: CPT | Performed by: EMERGENCY MEDICINE

## 2019-12-20 PROCEDURE — 84484 ASSAY OF TROPONIN QUANT: CPT | Performed by: EMERGENCY MEDICINE

## 2019-12-20 PROCEDURE — 70450 CT HEAD/BRAIN W/O DYE: CPT

## 2019-12-20 PROCEDURE — 96360 HYDRATION IV INFUSION INIT: CPT

## 2019-12-20 PROCEDURE — 71045 X-RAY EXAM CHEST 1 VIEW: CPT

## 2019-12-20 PROCEDURE — 93005 ELECTROCARDIOGRAM TRACING: CPT | Performed by: EMERGENCY MEDICINE

## 2019-12-20 PROCEDURE — 84443 ASSAY THYROID STIM HORMONE: CPT | Performed by: EMERGENCY MEDICINE

## 2019-12-20 PROCEDURE — 81003 URINALYSIS AUTO W/O SCOPE: CPT | Performed by: EMERGENCY MEDICINE

## 2019-12-20 PROCEDURE — 80053 COMPREHEN METABOLIC PANEL: CPT | Performed by: EMERGENCY MEDICINE

## 2019-12-20 PROCEDURE — 99284 EMERGENCY DEPT VISIT MOD MDM: CPT

## 2019-12-20 PROCEDURE — 87804 INFLUENZA ASSAY W/OPTIC: CPT | Performed by: EMERGENCY MEDICINE

## 2019-12-20 RX ORDER — SODIUM CHLORIDE 0.9 % (FLUSH) 0.9 %
10 SYRINGE (ML) INJECTION AS NEEDED
Status: DISCONTINUED | OUTPATIENT
Start: 2019-12-20 | End: 2019-12-20 | Stop reason: HOSPADM

## 2019-12-20 RX ADMIN — SODIUM CHLORIDE 1000 ML: 9 INJECTION, SOLUTION INTRAVENOUS at 10:25

## 2019-12-20 NOTE — DISCHARGE INSTRUCTIONS
Your TSH was elevated here today this suggests you may be hypothyroid.  You should follow-up with your family doctor regarding further work-up for this as this may contribute to some of your symptoms.  Increase her fluids through the weekend.  Return if progressive worsening symptoms.

## 2019-12-20 NOTE — ED PROVIDER NOTES
Subjective   History of Present Illness    Chief Complaint: Hypotension  History of Present Illness: Patient is an 88-year-old male anticoagulated with history of atrial fib atrial flutter, 80% carotid artery stenosis per family, has episode of speech changes overnight restlessness and check blood pressure this morning and had low pressure at home after taking blood pressure medications.  No fever no cough no urinary symptoms.  Feeling well now  Onset: Overnight  Duration: Single episode now resolved has had episodes of similar issues in the past  Exacerbating / Alleviating factors: None  Associated symptoms: None      Nurses Notes reviewed and agree, including vitals, allergies, social history and prior medical history.     REVIEW OF SYSTEMS: All systems reviewed and not pertinent unless noted.    Positive for: Restless overnight hypotensive this morning.  Some slight slurring of speech which is resolved    Negative for: Fever, cough, urinary symptoms, abdominal pain, chest pain, syncope, fall injury  Review of Systems    Past Medical History:   Diagnosis Date   • Bilateral renal cysts    • CKD (chronic kidney disease) stage 3, GFR 30-59 ml/min (CMS/[facility])    • COPD (chronic obstructive pulmonary disease) (CMS/[facility])    • DJD (degenerative joint disease), lumbar    • DM2 (diabetes mellitus, type 2) (CMS/[facility])    • Generalized osteoarthritis    • HLD (hyperlipidemia)    • HTN (hypertension)    • Kidney stones    • PAF (paroxysmal atrial fibrillation) (CMS/[facility])    • Vascular dementia (CMS/[facility])        Allergies   Allergen Reactions   • Pollen Extract Unknown (See Comments)     rhinitis       Past Surgical History:   Procedure Laterality Date   • COLONOSCOPY         Family History   Problem Relation Age of Onset   • Heart attack Mother    • Heart attack Father        Social History     Socioeconomic History   • Marital status:      Spouse name: N/A   • Number of children: 6   • Years of education: Electrical training    • Highest education level: Associate degree: occupational, technical, or vocational program   Occupational History   • Occupation:      Employer: RETIRED   Tobacco Use   • Smoking status: Former Smoker     Packs/day: 1.00     Years: 15.00     Pack years: 15.00     Types: Cigarettes     Last attempt to quit: 10/25/1996     Years since quittin.1   • Smokeless tobacco: Never Used   Substance and Sexual Activity   • Alcohol use: No     Frequency: Never   • Drug use: No   • Sexual activity: Never     Partners: Female           Objective   Physical Exam      GENERAL APPEARANCE: Well developed, well nourished, in no acute distress.  VITAL SIGNS: per nursing, reviewed and noted  SKIN: no rashes, ulcerations or petechiae.  Head: Normocephalic, atraumatic.   EYES: perrla. EOMI.  ENT:  TM clear, posterior pharynx patent.  LUNGS:  normal breath sounds. No retractions.   CARDIOVASCULAR:  regular rate and rhythm, no murmurs.  Good Peripheral pulses.  ABDOMEN: Soft, nontender, normal bowel sounds. No hernia. No ascites.  MUSCULOSKELETAL:  No tenderness. Full ROM. Strength and tone normal.  NEUROLOGIC: Alert, oriented x 3. No gross deficits.   NECK: Supple, symmetric. No tenderness, no masses. Full ROM  Back: full rom, no paraspinal spasm. No CVA tenderness.   PSYCH: appropriate affect,   : no bladder tenderness or distention, no CVA tenderness      Procedures     No attending provider procedures were performed    ED Course  ED Course as of Dec 20 1234   Fri Dec 20, 2019   0843 EKG interpreted by me reveals atrial flutter rate of 73.  No other ectopy.  No obvious ischemic changes.    [PF]   0902 COMPARISON: 2019.     FINDINGS: The heart is normal in size. The mediastinum is unremarkable.  There are chronic interstitial lung changes with fibrosis seen  bilaterally greater on the left than on the right. No new opacities were  effusions are evident. There is no pneumothorax.  There are no acute  osseous  abnormalities.     IMPRESSION:  Chronic interstitial lung changes with no acute  cardiopulmonary process.       [PF]   0902 COMPARISON:  None .     TECHNIQUE: Multiple axial CT images were performed from the foramen  magnum to the vertex without enhancement.      FINDINGS: There is generalized cerebral volume loss. Patchy  hypodensities in the periventricular white matter consistent with  chronic small vessel ischemic change.  There is no CT evidence of  hemorrhage. There is no mass, mass effect or midline shift.  There is no  hydrocephalus. The paranasal sinuses are clear. Bone windows reveal no  acute osseous abnormalities.     IMPRESSION:  No acute intracranial process.    [PF]   0957 TSH Baseline(!): 8.380 [PF]   0957 Hemoglobin(!): 10.8 [PF]   0957 Hematocrit(!): 33.5 [PF]   0957 BUN(!): 45 [PF]   0957 Creatinine(!): 1.91 [PF]   0957 Glucose(!): 176 [PF]   1114 Still awaiting urine, nontoxic. Bp stable.     [PF]      ED Course User Index  [PF] Nick Franklin, DO      12:34 PM  Patient looks well, nontoxic, appropriate mental status with no focal neurological deficits on evaluation.  Exam not suspicious for any obvious infectious process.  Abdomen soft nontender.  Chest clear.  Will add infectious work-up, head CT, basic labs        12:34 PM  Negative head CT negative chest x-ray, flu negative, pertinent labs include TSH of 8.38 and BUN 45 creatinine of 1.91 in the setting of chronic kidney disease and hypothyroidism both of which are worse when compared to baseline.  Will add IV fluids while awaiting urinalysis.  Reexamination otherwise benign at this time             No data recorded                        MDM  Patient presents with hypertension, found to be mildly dehydrated with acute on chronic renal insufficiency.  Evidence of abnormal high TSH.  Blood pressure stabilized with IV hydration.  Patient asymptomatic.  Reassuring infectious work-up.  Will discharge home in stable condition with recommendations  for aggressive oral hydration.  Follow-up with his family doctor regarding his abnormal TSH  Final diagnoses:   Acute on chronic renal insufficiency   Mild dehydration   Abnormal TSH              Nick Franklin, DO  12/20/19 3257

## 2019-12-20 NOTE — ED NOTES
Patient was unable to give urine specimen at this time, Dinorah MATIAS was notified.     Padma Cardenas  12/20/19 0994

## 2020-01-02 RX ORDER — APIXABAN 5 MG/1
TABLET, FILM COATED ORAL
Qty: 60 TABLET | Refills: 6 | Status: SHIPPED | OUTPATIENT
Start: 2020-01-02 | End: 2020-04-29 | Stop reason: HOSPADM

## 2020-02-28 ENCOUNTER — LAB (OUTPATIENT)
Dept: LAB | Facility: HOSPITAL | Age: 85
End: 2020-02-28

## 2020-02-28 ENCOUNTER — TRANSCRIBE ORDERS (OUTPATIENT)
Dept: ADMINISTRATIVE | Facility: HOSPITAL | Age: 85
End: 2020-02-28

## 2020-02-28 DIAGNOSIS — D64.9 ANEMIA, UNSPECIFIED TYPE: ICD-10-CM

## 2020-02-28 DIAGNOSIS — D64.9 ANEMIA, UNSPECIFIED TYPE: Primary | ICD-10-CM

## 2020-02-28 LAB
FERRITIN SERPL-MCNC: 241 NG/ML (ref 30–400)
FOLATE SERPL-MCNC: 9.22 NG/ML (ref 4.78–24.2)
IRON 24H UR-MRATE: 50 MCG/DL (ref 59–158)
IRON SATN MFR SERPL: 17 % (ref 20–50)
T4 FREE SERPL-MCNC: 1.34 NG/DL (ref 0.93–1.7)
TIBC SERPL-MCNC: 291 MCG/DL (ref 298–536)
TRANSFERRIN SERPL-MCNC: 195 MG/DL (ref 200–360)
TSH SERPL DL<=0.05 MIU/L-ACNC: 5.41 UIU/ML (ref 0.27–4.2)

## 2020-02-28 PROCEDURE — 83540 ASSAY OF IRON: CPT

## 2020-02-28 PROCEDURE — 36415 COLL VENOUS BLD VENIPUNCTURE: CPT

## 2020-02-28 PROCEDURE — 84466 ASSAY OF TRANSFERRIN: CPT

## 2020-02-28 PROCEDURE — 82728 ASSAY OF FERRITIN: CPT

## 2020-02-28 PROCEDURE — 82746 ASSAY OF FOLIC ACID SERUM: CPT

## 2020-02-28 PROCEDURE — 84439 ASSAY OF FREE THYROXINE: CPT

## 2020-02-28 PROCEDURE — 84443 ASSAY THYROID STIM HORMONE: CPT

## 2020-03-04 ENCOUNTER — APPOINTMENT (OUTPATIENT)
Dept: GENERAL RADIOLOGY | Facility: HOSPITAL | Age: 85
End: 2020-03-04

## 2020-03-04 ENCOUNTER — HOSPITAL ENCOUNTER (EMERGENCY)
Facility: HOSPITAL | Age: 85
Discharge: HOME | End: 2020-03-04
Attending: EMERGENCY MEDICINE | Admitting: EMERGENCY MEDICINE

## 2020-03-04 ENCOUNTER — TELEPHONE (OUTPATIENT)
Dept: CARDIOLOGY | Facility: CLINIC | Age: 85
End: 2020-03-04

## 2020-03-04 VITALS
HEART RATE: 73 BPM | SYSTOLIC BLOOD PRESSURE: 155 MMHG | WEIGHT: 146 LBS | BODY MASS INDEX: 21.62 KG/M2 | RESPIRATION RATE: 16 BRPM | OXYGEN SATURATION: 96 % | DIASTOLIC BLOOD PRESSURE: 85 MMHG | HEIGHT: 69 IN

## 2020-03-04 DIAGNOSIS — I50.9 CONGESTIVE HEART FAILURE, UNSPECIFIED HF CHRONICITY, UNSPECIFIED HEART FAILURE TYPE (HCC): Primary | ICD-10-CM

## 2020-03-04 DIAGNOSIS — J81.1 CHRONIC PULMONARY EDEMA: ICD-10-CM

## 2020-03-04 LAB
ALBUMIN SERPL-MCNC: 4.3 G/DL (ref 3.5–5.2)
ALBUMIN/GLOB SERPL: 1.2 G/DL
ALP SERPL-CCNC: 106 U/L (ref 39–117)
ALT SERPL W P-5'-P-CCNC: 15 U/L (ref 1–41)
ANION GAP SERPL CALCULATED.3IONS-SCNC: 19.1 MMOL/L (ref 5–15)
AST SERPL-CCNC: 18 U/L (ref 1–40)
BASOPHILS # BLD AUTO: 0.04 10*3/MM3 (ref 0–0.2)
BASOPHILS NFR BLD AUTO: 0.6 % (ref 0–1.5)
BILIRUB SERPL-MCNC: 0.7 MG/DL (ref 0.2–1.2)
BUN BLD-MCNC: 23 MG/DL (ref 8–23)
BUN/CREAT SERPL: 16.2 (ref 7–25)
CALCIUM SPEC-SCNC: 8.7 MG/DL (ref 8.6–10.5)
CHLORIDE SERPL-SCNC: 94 MMOL/L (ref 98–107)
CO2 SERPL-SCNC: 21.9 MMOL/L (ref 22–29)
CREAT BLD-MCNC: 1.42 MG/DL (ref 0.76–1.27)
DEPRECATED RDW RBC AUTO: 51.8 FL (ref 37–54)
EOSINOPHIL # BLD AUTO: 0.11 10*3/MM3 (ref 0–0.4)
EOSINOPHIL NFR BLD AUTO: 1.5 % (ref 0.3–6.2)
ERYTHROCYTE [DISTWIDTH] IN BLOOD BY AUTOMATED COUNT: 14.9 % (ref 12.3–15.4)
FLUAV AG NPH QL: NEGATIVE
FLUBV AG NPH QL IA: NEGATIVE
GFR SERPL CREATININE-BSD FRML MDRD: 47 ML/MIN/1.73
GLOBULIN UR ELPH-MCNC: 3.7 GM/DL
GLUCOSE BLD-MCNC: 215 MG/DL (ref 65–99)
HCT VFR BLD AUTO: 33 % (ref 37.5–51)
HGB BLD-MCNC: 11 G/DL (ref 13–17.7)
HOLD SPECIMEN: NORMAL
HOLD SPECIMEN: NORMAL
IMM GRANULOCYTES # BLD AUTO: 0.02 10*3/MM3 (ref 0–0.05)
IMM GRANULOCYTES NFR BLD AUTO: 0.3 % (ref 0–0.5)
LYMPHOCYTES # BLD AUTO: 0.96 10*3/MM3 (ref 0.7–3.1)
LYMPHOCYTES NFR BLD AUTO: 13.3 % (ref 19.6–45.3)
MCH RBC QN AUTO: 32 PG (ref 26.6–33)
MCHC RBC AUTO-ENTMCNC: 33.3 G/DL (ref 31.5–35.7)
MCV RBC AUTO: 95.9 FL (ref 79–97)
MONOCYTES # BLD AUTO: 0.52 10*3/MM3 (ref 0.1–0.9)
MONOCYTES NFR BLD AUTO: 7.2 % (ref 5–12)
NEUTROPHILS # BLD AUTO: 5.57 10*3/MM3 (ref 1.7–7)
NEUTROPHILS NFR BLD AUTO: 77.1 % (ref 42.7–76)
NRBC BLD AUTO-RTO: 0 /100 WBC (ref 0–0.2)
NT-PROBNP SERPL-MCNC: 5438 PG/ML (ref 5–1800)
PLATELET # BLD AUTO: 284 10*3/MM3 (ref 140–450)
PMV BLD AUTO: 9.5 FL (ref 6–12)
POTASSIUM BLD-SCNC: 3.7 MMOL/L (ref 3.5–5.2)
PROCALCITONIN SERPL-MCNC: 0.06 NG/ML (ref 0.1–0.25)
PROT SERPL-MCNC: 8 G/DL (ref 6–8.5)
RBC # BLD AUTO: 3.44 10*6/MM3 (ref 4.14–5.8)
SODIUM BLD-SCNC: 135 MMOL/L (ref 136–145)
TROPONIN T SERPL-MCNC: 0.01 NG/ML (ref 0–0.03)
WBC NRBC COR # BLD: 7.22 10*3/MM3 (ref 3.4–10.8)
WHOLE BLOOD HOLD SPECIMEN: NORMAL
WHOLE BLOOD HOLD SPECIMEN: NORMAL

## 2020-03-04 PROCEDURE — 83880 ASSAY OF NATRIURETIC PEPTIDE: CPT | Performed by: EMERGENCY MEDICINE

## 2020-03-04 PROCEDURE — 87804 INFLUENZA ASSAY W/OPTIC: CPT | Performed by: EMERGENCY MEDICINE

## 2020-03-04 PROCEDURE — 80053 COMPREHEN METABOLIC PANEL: CPT | Performed by: EMERGENCY MEDICINE

## 2020-03-04 PROCEDURE — 96374 THER/PROPH/DIAG INJ IV PUSH: CPT

## 2020-03-04 PROCEDURE — 94799 UNLISTED PULMONARY SVC/PX: CPT

## 2020-03-04 PROCEDURE — 93005 ELECTROCARDIOGRAM TRACING: CPT | Performed by: EMERGENCY MEDICINE

## 2020-03-04 PROCEDURE — 84145 PROCALCITONIN (PCT): CPT | Performed by: EMERGENCY MEDICINE

## 2020-03-04 PROCEDURE — 94640 AIRWAY INHALATION TREATMENT: CPT

## 2020-03-04 PROCEDURE — 99283 EMERGENCY DEPT VISIT LOW MDM: CPT

## 2020-03-04 PROCEDURE — 85025 COMPLETE CBC W/AUTO DIFF WBC: CPT | Performed by: EMERGENCY MEDICINE

## 2020-03-04 PROCEDURE — 84484 ASSAY OF TROPONIN QUANT: CPT | Performed by: EMERGENCY MEDICINE

## 2020-03-04 PROCEDURE — 25010000002 FUROSEMIDE PER 20 MG: Performed by: EMERGENCY MEDICINE

## 2020-03-04 PROCEDURE — 71046 X-RAY EXAM CHEST 2 VIEWS: CPT

## 2020-03-04 RX ORDER — SODIUM CHLORIDE 0.9 % (FLUSH) 0.9 %
10 SYRINGE (ML) INJECTION AS NEEDED
Status: DISCONTINUED | OUTPATIENT
Start: 2020-03-04 | End: 2020-03-04 | Stop reason: HOSPADM

## 2020-03-04 RX ORDER — FUROSEMIDE 20 MG/1
20 TABLET ORAL DAILY
Qty: 5 TABLET | Refills: 0 | Status: SHIPPED | OUTPATIENT
Start: 2020-03-04 | End: 2020-03-09

## 2020-03-04 RX ORDER — FUROSEMIDE 10 MG/ML
40 INJECTION INTRAMUSCULAR; INTRAVENOUS ONCE
Status: COMPLETED | OUTPATIENT
Start: 2020-03-04 | End: 2020-03-04

## 2020-03-04 RX ORDER — IPRATROPIUM BROMIDE AND ALBUTEROL SULFATE 2.5; .5 MG/3ML; MG/3ML
3 SOLUTION RESPIRATORY (INHALATION) ONCE
Status: COMPLETED | OUTPATIENT
Start: 2020-03-04 | End: 2020-03-04

## 2020-03-04 RX ADMIN — IPRATROPIUM BROMIDE AND ALBUTEROL SULFATE 3 ML: .5; 3 SOLUTION RESPIRATORY (INHALATION) at 14:02

## 2020-03-04 RX ADMIN — FUROSEMIDE 40 MG: 10 INJECTION, SOLUTION INTRAMUSCULAR; INTRAVENOUS at 16:07

## 2020-03-04 NOTE — TELEPHONE ENCOUNTER
[place], patient's daughter, called, LVM stating that patient  Was shaky, jittery and SOB.    Called pt's daughter back, no answer, LVM.

## 2020-03-04 NOTE — ED PROVIDER NOTES
Subjective   88-year-old male presents to the ED with his daughter for chief complaint of shortness of breath.  The daughter indicates that the patient started complaining of shortness of breath this morning.  She thought that he seemed to have increased work of breathing.  He notes that he has had a infrequent cough.  Symptoms started suddenly this morning.  Patient denies chest pain.  Denies nausea vomiting diarrhea abdominal pain.  Denies fever chills.  No prior treatments or limiting factors.  No other complaints at this time.          Review of Systems   Constitutional: Positive for fatigue. Negative for fever.   Respiratory: Positive for cough and shortness of breath. Negative for chest tightness and wheezing.    Cardiovascular: Negative for chest pain and palpitations.   Gastrointestinal: Negative for abdominal distention.   All other systems reviewed and are negative.      Past Medical History:   Diagnosis Date   • Bilateral renal cysts    • CKD (chronic kidney disease) stage 3, GFR 30-59 ml/min (CMS/[facility])    • COPD (chronic obstructive pulmonary disease) (CMS/[facility])    • DJD (degenerative joint disease), lumbar    • DM2 (diabetes mellitus, type 2) (CMS/[facility])    • Generalized osteoarthritis    • HLD (hyperlipidemia)    • HTN (hypertension)    • Kidney stones    • PAF (paroxysmal atrial fibrillation) (CMS/[facility])    • Vascular dementia (CMS/[facility])        Allergies   Allergen Reactions   • Pollen Extract Unknown (See Comments)     rhinitis       Past Surgical History:   Procedure Laterality Date   • COLONOSCOPY         Family History   Problem Relation Age of Onset   • Heart attack Mother    • Heart attack Father        Social History     Socioeconomic History   • Marital status:      Spouse name: N/A   • Number of children: 6   • Years of education: Electrical training   • Highest education level: Associate degree: occupational, technical, or vocational program   Occupational History   • Occupation:   "    Employer: RETIRED   Tobacco Use   • Smoking status: Former Smoker     Packs/day: 1.00     Years: 15.00     Pack years: 15.00     Types: Cigarettes     Last attempt to quit: 10/25/1996     Years since quittin.3   • Smokeless tobacco: Never Used   Substance and Sexual Activity   • Alcohol use: No     Frequency: Never   • Drug use: No   • Sexual activity: Never     Partners: Female           Objective   Physical Exam   Constitutional: He is oriented to person, place, and time. No distress.   Elderly appearing.   HENT:   Head: Normocephalic and atraumatic.   Nose: Nose normal.   Eyes: Pupils are equal, round, and reactive to light. Conjunctivae and EOM are normal.   Cardiovascular: Normal rate, regular rhythm and intact distal pulses.   Regular rhythm.  Regular rate.  Atrial flutter with 3-1 rhythm on the monitor   Pulmonary/Chest: Effort normal. No respiratory distress.   Coarse breath sounds bilaterally.  Worse in the right lung field.  There is gastric sounds in the left lower chest.   Abdominal: Soft. He exhibits no distension. There is no tenderness.   Musculoskeletal: He exhibits edema. He exhibits no deformity.   Trace edema bilateral lower extremities   Neurological: He is alert and oriented to person, place, and time. No cranial nerve deficit. Coordination normal.   Skin: He is not diaphoretic.   Nursing note and vitals reviewed.      Procedures           ED Course      EKG interpreted by me.  Atrial flutter.  Rate of 71.  Nonspecific ST segment depressions.  Abnormal EKG.                                     MDM  /85   Pulse 72   Resp 16   Ht 175.3 cm (69\")   Wt 66.2 kg (146 lb)   SpO2 100%   BMI 21.56 kg/m²     88-year-old male presents with shortness of breath.  On initial evaluation he has normal respiratory effort is resting comfortably.  Daughter indicates that he had some shortness of breath with their walk today and wanted to end his walk earlier than normal.  Mild edema of the " bilateral lower extremities.  Pulse ox of 99% on room air.  Work-up here was reassuring.  Troponin negative.  EKG was nonischemic does show known atrial flutter.  Controlled.  BNP slightly elevated.  Chest x-ray shows likely pulmonary edema.  Patient was given a dose of Lasix with good diuresis here in the ED.  My reevaluation had a pulse ox of 100% on room air.  Respiratory rate of 16.  Shared decision making with the family.  They indicated that with some diuresis I think he is appropriate to go home and the patient would prefer to go home.  Given the reassuring work-up we will discharge the patient.  Strict return precautions given.  Follow-up as needed.  Family agreeable to the plan.        Final diagnoses:   Congestive heart failure, unspecified HF chronicity, unspecified heart failure type (CMS/HCC)   Chronic pulmonary edema            Christopher Rabago, DO  03/04/20 3234

## 2020-04-19 ENCOUNTER — APPOINTMENT (OUTPATIENT)
Dept: CT IMAGING | Facility: HOSPITAL | Age: 85
End: 2020-04-19

## 2020-04-19 ENCOUNTER — APPOINTMENT (OUTPATIENT)
Dept: GENERAL RADIOLOGY | Facility: HOSPITAL | Age: 85
End: 2020-04-19

## 2020-04-19 ENCOUNTER — HOSPITAL ENCOUNTER (EMERGENCY)
Facility: HOSPITAL | Age: 85
Discharge: HOME | End: 2020-04-19
Attending: STUDENT IN AN ORGANIZED HEALTH CARE EDUCATION/TRAINING PROGRAM | Admitting: EMERGENCY MEDICINE

## 2020-04-19 VITALS
OXYGEN SATURATION: 92 % | TEMPERATURE: 98.4 F | HEART RATE: 72 BPM | RESPIRATION RATE: 20 BRPM | WEIGHT: 150 LBS | SYSTOLIC BLOOD PRESSURE: 152 MMHG | BODY MASS INDEX: 22.15 KG/M2 | DIASTOLIC BLOOD PRESSURE: 123 MMHG

## 2020-04-19 DIAGNOSIS — J96.01 ACUTE RESPIRATORY FAILURE WITH HYPOXIA (HCC): Primary | ICD-10-CM

## 2020-04-19 LAB
ALBUMIN SERPL-MCNC: 3.8 G/DL (ref 3.5–5.2)
ALBUMIN/GLOB SERPL: 1.1 G/DL
ALP SERPL-CCNC: 96 U/L (ref 39–117)
ALT SERPL W P-5'-P-CCNC: 13 U/L (ref 1–41)
ANION GAP SERPL CALCULATED.3IONS-SCNC: 20.2 MMOL/L (ref 5–15)
AST SERPL-CCNC: 18 U/L (ref 1–40)
BASOPHILS # BLD AUTO: 0.03 10*3/MM3 (ref 0–0.2)
BASOPHILS NFR BLD AUTO: 0.4 % (ref 0–1.5)
BILIRUB SERPL-MCNC: 0.6 MG/DL (ref 0.2–1.2)
BUN BLD-MCNC: 29 MG/DL (ref 8–23)
BUN/CREAT SERPL: 19.7 (ref 7–25)
CALCIUM SPEC-SCNC: 8.5 MG/DL (ref 8.6–10.5)
CHLORIDE SERPL-SCNC: 97 MMOL/L (ref 98–107)
CO2 SERPL-SCNC: 22.8 MMOL/L (ref 22–29)
CREAT BLD-MCNC: 1.47 MG/DL (ref 0.76–1.27)
DEPRECATED RDW RBC AUTO: 55.4 FL (ref 37–54)
EOSINOPHIL # BLD AUTO: 0.1 10*3/MM3 (ref 0–0.4)
EOSINOPHIL NFR BLD AUTO: 1.4 % (ref 0.3–6.2)
ERYTHROCYTE [DISTWIDTH] IN BLOOD BY AUTOMATED COUNT: 15.8 % (ref 12.3–15.4)
GFR SERPL CREATININE-BSD FRML MDRD: 45 ML/MIN/1.73
GLOBULIN UR ELPH-MCNC: 3.6 GM/DL
GLUCOSE BLD-MCNC: 179 MG/DL (ref 65–99)
HCT VFR BLD AUTO: 29.1 % (ref 37.5–51)
HGB BLD-MCNC: 9.4 G/DL (ref 13–17.7)
IMM GRANULOCYTES # BLD AUTO: 0.04 10*3/MM3 (ref 0–0.05)
IMM GRANULOCYTES NFR BLD AUTO: 0.5 % (ref 0–0.5)
LYMPHOCYTES # BLD AUTO: 1.38 10*3/MM3 (ref 0.7–3.1)
LYMPHOCYTES NFR BLD AUTO: 18.8 % (ref 19.6–45.3)
MCH RBC QN AUTO: 30.9 PG (ref 26.6–33)
MCHC RBC AUTO-ENTMCNC: 32.3 G/DL (ref 31.5–35.7)
MCV RBC AUTO: 95.7 FL (ref 79–97)
MONOCYTES # BLD AUTO: 0.64 10*3/MM3 (ref 0.1–0.9)
MONOCYTES NFR BLD AUTO: 8.7 % (ref 5–12)
NEUTROPHILS # BLD AUTO: 5.15 10*3/MM3 (ref 1.7–7)
NEUTROPHILS NFR BLD AUTO: 70.2 % (ref 42.7–76)
NRBC BLD AUTO-RTO: 0 /100 WBC (ref 0–0.2)
PLATELET # BLD AUTO: 234 10*3/MM3 (ref 140–450)
PMV BLD AUTO: 10.4 FL (ref 6–12)
POTASSIUM BLD-SCNC: 3.1 MMOL/L (ref 3.5–5.2)
PROT SERPL-MCNC: 7.4 G/DL (ref 6–8.5)
RBC # BLD AUTO: 3.04 10*6/MM3 (ref 4.14–5.8)
SODIUM BLD-SCNC: 140 MMOL/L (ref 136–145)
TROPONIN T SERPL-MCNC: 0.02 NG/ML (ref 0–0.03)
WBC NRBC COR # BLD: 7.34 10*3/MM3 (ref 3.4–10.8)

## 2020-04-19 PROCEDURE — 70450 CT HEAD/BRAIN W/O DYE: CPT

## 2020-04-19 PROCEDURE — 71046 X-RAY EXAM CHEST 2 VIEWS: CPT

## 2020-04-19 PROCEDURE — 85025 COMPLETE CBC W/AUTO DIFF WBC: CPT | Performed by: PHYSICIAN ASSISTANT

## 2020-04-19 PROCEDURE — 93005 ELECTROCARDIOGRAM TRACING: CPT | Performed by: PHYSICIAN ASSISTANT

## 2020-04-19 PROCEDURE — 99284 EMERGENCY DEPT VISIT MOD MDM: CPT

## 2020-04-19 PROCEDURE — 99283 EMERGENCY DEPT VISIT LOW MDM: CPT

## 2020-04-19 PROCEDURE — 84484 ASSAY OF TROPONIN QUANT: CPT | Performed by: PHYSICIAN ASSISTANT

## 2020-04-19 PROCEDURE — 80053 COMPREHEN METABOLIC PANEL: CPT | Performed by: PHYSICIAN ASSISTANT

## 2020-04-19 RX ORDER — POTASSIUM CHLORIDE 750 MG/1
20 TABLET, FILM COATED, EXTENDED RELEASE ORAL DAILY
Qty: 6 TABLET | Refills: 0 | Status: SHIPPED | OUTPATIENT
Start: 2020-04-19 | End: 2020-04-29 | Stop reason: HOSPADM

## 2020-04-19 RX ORDER — POTASSIUM CHLORIDE 750 MG/1
40 CAPSULE, EXTENDED RELEASE ORAL ONCE
Status: COMPLETED | OUTPATIENT
Start: 2020-04-19 | End: 2020-04-19

## 2020-04-19 RX ADMIN — POTASSIUM CHLORIDE 40 MEQ: 750 CAPSULE, EXTENDED RELEASE ORAL at 20:29

## 2020-04-20 ENCOUNTER — TELEPHONE (OUTPATIENT)
Dept: CARDIOLOGY | Facility: CLINIC | Age: 85
End: 2020-04-20

## 2020-04-20 RX ORDER — ISOSORBIDE MONONITRATE 60 MG/1
60 TABLET, EXTENDED RELEASE ORAL EVERY MORNING
Qty: 90 TABLET | Refills: 3 | Status: SHIPPED | OUTPATIENT
Start: 2020-04-20 | End: 2020-04-29 | Stop reason: HOSPADM

## 2020-04-20 RX ORDER — TORSEMIDE 5 MG/1
5 TABLET ORAL DAILY
Qty: 90 TABLET | Refills: 1 | Status: SHIPPED | OUTPATIENT
Start: 2020-04-20 | End: 2020-04-29 | Stop reason: HOSPADM

## 2020-04-20 RX ORDER — NITROGLYCERIN 400 UG/1
1 SPRAY ORAL
Qty: 4.9 G | Refills: 12 | Status: SHIPPED | OUTPATIENT
Start: 2020-04-20 | End: 2020-04-29 | Stop reason: HOSPADM

## 2020-04-20 NOTE — TELEPHONE ENCOUNTER
"Pt's daughter [place] called and stated that pt has been having SOB increased with exertion and chest pain. Pt has had SOB and chest pain for about 1 week. Pt's daughter states that pt describes chest pain as a sharp pain in the middle of his chest, \"all the time\". Pt reports palpitations.    BP has been running around 169/87    Pt denies swelling.    Pt was seen in ER 3/4/2020 and 4/19/20.    Pt has called PCP and they were advise to call [facility].     [place] states that were told to follow-up with Dr. Peterson by ER last night.       Pt currently taking:  Potassium 20 mEq for three days.  Metoprolol tartrate 12.5 mg BID  Lisinopril 10 mg daily      Please advise.  "

## 2020-04-20 NOTE — TELEPHONE ENCOUNTER
"Noted; would add NTG spray 0.4 mg SL as needed as well as Imdur 60 mg daily and take Tylenol as needed \"sharp\" chest pain.  Would additionally add torsemide 5 mg daily.  Would work into the office in the next few days for follow-up assessment.    Thanks!  "

## 2020-04-20 NOTE — ED PROVIDER NOTES
Subjective   88-year-old male presents as a near syncopal episode, he has a history of dementia, and is a poor historian.  We talked with the patient's family and caregiver who states that he got up earlier today was walking and almost passed out.  He does have history of A. fib, and dementia.  He is unable to give any kind of history.  He was brought in by family, his vital signs are stable.      History provided by:  Caregiver   used: No        Review of Systems   Neurological:        Near syncope   All other systems reviewed and are negative.      Past Medical History:   Diagnosis Date   • Bilateral renal cysts    • CKD (chronic kidney disease) stage 3, GFR 30-59 ml/min (CMS/[facility])    • COPD (chronic obstructive pulmonary disease) (CMS/[facility])    • DJD (degenerative joint disease), lumbar    • DM2 (diabetes mellitus, type 2) (CMS/[facility])    • Generalized osteoarthritis    • HLD (hyperlipidemia)    • HTN (hypertension)    • Kidney stones    • PAF (paroxysmal atrial fibrillation) (CMS/[facility])    • Vascular dementia (CMS/[facility])        Allergies   Allergen Reactions   • Pollen Extract Unknown (See Comments)     rhinitis       Past Surgical History:   Procedure Laterality Date   • COLONOSCOPY         Family History   Problem Relation Age of Onset   • Heart attack Mother    • Heart attack Father        Social History     Socioeconomic History   • Marital status:      Spouse name: N/A   • Number of children: 6   • Years of education: Electrical training   • Highest education level: Associate degree: occupational, technical, or vocational program   Occupational History   • Occupation:      Employer: RETIRED   Tobacco Use   • Smoking status: Former Smoker     Packs/day: 1.00     Years: 15.00     Pack years: 15.00     Types: Cigarettes     Last attempt to quit: 10/25/1996     Years since quittin.5   • Smokeless tobacco: Never Used   Substance and Sexual Activity   • Alcohol use: No      Frequency: Never   • Drug use: No   • Sexual activity: Never     Partners: Female           Objective   Physical Exam   Constitutional: He appears well-developed and well-nourished.   HENT:   Head: Normocephalic and atraumatic.   Eyes: Pupils are equal, round, and reactive to light. EOM are normal.   Neck: Normal range of motion.   Cardiovascular: Normal rate and regular rhythm.   Pulmonary/Chest: Effort normal and breath sounds normal.   Abdominal: Soft. Bowel sounds are normal.   Musculoskeletal: Normal range of motion.   Neurological: He is alert. No cranial nerve deficit. He exhibits normal muscle tone.   Skin: Skin is warm and dry.   Psychiatric: He has a normal mood and affect. His behavior is normal.   Nursing note and vitals reviewed.      Procedures           ED Course  ED Course as of Apr 29 1032   Sun Apr 19, 2020 2008 EKG interpreted by me.  Atrial fibrillation.  Rate of 79.  Nonspecific ST segment T wave abnormalities.  Artifact present.  Abnormal EKG.    [CG]   2020 Discussed the plan with family, daughter is power of .   Patient received potassium due to hypokalemia.  She is coming to pick the patient up.    [CS]   [place] Apr 21, 2020   1252 Chest x-ray shows increased markings bilaterally.  Pulmonary edema versus pneumonia.  Will give Lasix and obtain CT of his chest    [DT]      ED Course User Index  [CG] Christopher Rabago DO  [CS] Broderick Black Jr., PA-C  [DT] Moe Lowe MD                                           MDM  Number of Diagnoses or Management Options  Dementia with behavioral disturbance, unspecified dementia type (CMS/HCC): new and requires workup  Near syncope: new and requires workup     Amount and/or Complexity of Data Reviewed  Clinical lab tests: reviewed  Tests in the radiology section of CPT®: reviewed  Decide to obtain previous medical records or to obtain history from someone other than the patient: yes    Risk of Complications, Morbidity, and/or  Mortality  Presenting problems: low  Diagnostic procedures: minimal  Management options: low    Patient Progress  Patient progress: stable      Final diagnoses:   Acute respiratory failure with hypoxia (CMS/HCC)            Broderick Black Jr., PA-C  04/19/20 2022       Broderick Black Jr., PA-C  04/19/20 2023       Broderick Black Jr., PA-C  04/29/20 1032

## 2020-04-21 ENCOUNTER — HOSPITAL ENCOUNTER (INPATIENT)
Facility: HOSPITAL | Age: 85
LOS: 8 days | Discharge: HOSPICE | End: 2020-04-29
Attending: EMERGENCY MEDICINE | Admitting: INTERNAL MEDICINE

## 2020-04-21 ENCOUNTER — APPOINTMENT (OUTPATIENT)
Dept: CT IMAGING | Facility: HOSPITAL | Age: 85
End: 2020-04-21

## 2020-04-21 ENCOUNTER — APPOINTMENT (OUTPATIENT)
Dept: GENERAL RADIOLOGY | Facility: HOSPITAL | Age: 85
End: 2020-04-21

## 2020-04-21 DIAGNOSIS — E87.20 LACTIC ACIDOSIS: ICD-10-CM

## 2020-04-21 DIAGNOSIS — J81.0 ACUTE PULMONARY EDEMA (HCC): ICD-10-CM

## 2020-04-21 DIAGNOSIS — Z79.01 ANTICOAGULATED: ICD-10-CM

## 2020-04-21 DIAGNOSIS — R13.10 DYSPHAGIA, UNSPECIFIED TYPE: ICD-10-CM

## 2020-04-21 DIAGNOSIS — J96.01 ACUTE RESPIRATORY FAILURE WITH HYPOXIA (HCC): Primary | ICD-10-CM

## 2020-04-21 DIAGNOSIS — R04.2 HEMOPTYSIS: ICD-10-CM

## 2020-04-21 DIAGNOSIS — Z78.9 IMPAIRED MOBILITY AND ADLS: ICD-10-CM

## 2020-04-21 DIAGNOSIS — Z51.5 HOSPICE CARE: ICD-10-CM

## 2020-04-21 DIAGNOSIS — Z74.09 IMPAIRED MOBILITY AND ADLS: ICD-10-CM

## 2020-04-21 PROBLEM — I50.33 ACUTE ON CHRONIC DIASTOLIC HEART FAILURE (HCC): Status: ACTIVE | Noted: 2020-04-21

## 2020-04-21 PROBLEM — R77.8 ELEVATED TROPONIN: Status: ACTIVE | Noted: 2020-04-21

## 2020-04-21 LAB
ALBUMIN SERPL-MCNC: 4.3 G/DL (ref 3.5–5.2)
ALBUMIN/GLOB SERPL: 1.2 G/DL
ALP SERPL-CCNC: 98 U/L (ref 39–117)
ALT SERPL W P-5'-P-CCNC: 15 U/L (ref 1–41)
ANION GAP SERPL CALCULATED.3IONS-SCNC: 21 MMOL/L (ref 5–15)
AST SERPL-CCNC: 24 U/L (ref 1–40)
BASOPHILS # BLD AUTO: 0.02 10*3/MM3 (ref 0–0.2)
BASOPHILS NFR BLD AUTO: 0.2 % (ref 0–1.5)
BILIRUB SERPL-MCNC: 0.8 MG/DL (ref 0.2–1.2)
BUN BLD-MCNC: 33 MG/DL (ref 8–23)
BUN/CREAT SERPL: 22 (ref 7–25)
CALCIUM SPEC-SCNC: 8.6 MG/DL (ref 8.6–10.5)
CHLORIDE SERPL-SCNC: 96 MMOL/L (ref 98–107)
CO2 SERPL-SCNC: 20 MMOL/L (ref 22–29)
CREAT BLD-MCNC: 1.5 MG/DL (ref 0.76–1.27)
CRP SERPL-MCNC: 1.88 MG/DL (ref 0–0.5)
D DIMER PPP FEU-MCNC: 3.48 MCGFEU/ML (ref 0–0.56)
D-LACTATE SERPL-SCNC: 10.8 MMOL/L (ref 0.5–2)
D-LACTATE SERPL-SCNC: 9 MMOL/L (ref 0.5–2)
DEPRECATED RDW RBC AUTO: 57 FL (ref 37–54)
EOSINOPHIL # BLD AUTO: 0.19 10*3/MM3 (ref 0–0.4)
EOSINOPHIL NFR BLD AUTO: 2.1 % (ref 0.3–6.2)
ERYTHROCYTE [DISTWIDTH] IN BLOOD BY AUTOMATED COUNT: 16 % (ref 12.3–15.4)
GFR SERPL CREATININE-BSD FRML MDRD: 44 ML/MIN/1.73
GLOBULIN UR ELPH-MCNC: 3.7 GM/DL
GLUCOSE BLD-MCNC: 350 MG/DL (ref 65–99)
GLUCOSE BLDC GLUCOMTR-MCNC: 164 MG/DL (ref 70–130)
GLUCOSE BLDC GLUCOMTR-MCNC: 295 MG/DL (ref 70–130)
HCT VFR BLD AUTO: 32.5 % (ref 37.5–51)
HGB BLD-MCNC: 10.4 G/DL (ref 13–17.7)
HOLD SPECIMEN: NORMAL
HOLD SPECIMEN: NORMAL
IMM GRANULOCYTES # BLD AUTO: 0.03 10*3/MM3 (ref 0–0.05)
IMM GRANULOCYTES NFR BLD AUTO: 0.3 % (ref 0–0.5)
LACTATE HOLD SPECIMEN: NORMAL
LDH SERPL-CCNC: 277 U/L (ref 135–225)
LIPASE SERPL-CCNC: 52 U/L (ref 13–60)
LYMPHOCYTES # BLD AUTO: 0.93 10*3/MM3 (ref 0.7–3.1)
LYMPHOCYTES NFR BLD AUTO: 10.3 % (ref 19.6–45.3)
MCH RBC QN AUTO: 31 PG (ref 26.6–33)
MCHC RBC AUTO-ENTMCNC: 32 G/DL (ref 31.5–35.7)
MCV RBC AUTO: 96.7 FL (ref 79–97)
MONOCYTES # BLD AUTO: 0.43 10*3/MM3 (ref 0.1–0.9)
MONOCYTES NFR BLD AUTO: 4.8 % (ref 5–12)
NEUTROPHILS # BLD AUTO: 7.43 10*3/MM3 (ref 1.7–7)
NEUTROPHILS NFR BLD AUTO: 82.3 % (ref 42.7–76)
NRBC BLD AUTO-RTO: 0 /100 WBC (ref 0–0.2)
NT-PROBNP SERPL-MCNC: 6121 PG/ML (ref 5–1800)
PLAT MORPH BLD: NORMAL
PLATELET # BLD AUTO: 288 10*3/MM3 (ref 140–450)
PMV BLD AUTO: 10.6 FL (ref 6–12)
POTASSIUM BLD-SCNC: 4.2 MMOL/L (ref 3.5–5.2)
PROCALCITONIN SERPL-MCNC: 0.06 NG/ML (ref 0.1–0.25)
PROT SERPL-MCNC: 8 G/DL (ref 6–8.5)
RBC # BLD AUTO: 3.36 10*6/MM3 (ref 4.14–5.8)
SCHISTOCYTES BLD QL SMEAR: NORMAL
SODIUM BLD-SCNC: 137 MMOL/L (ref 136–145)
TROPONIN T SERPL-MCNC: 0.05 NG/ML (ref 0–0.03)
TROPONIN T SERPL-MCNC: 0.15 NG/ML (ref 0–0.03)
WBC MORPH BLD: NORMAL
WBC NRBC COR # BLD: 9.03 10*3/MM3 (ref 3.4–10.8)
WHOLE BLOOD HOLD SPECIMEN: NORMAL
WHOLE BLOOD HOLD SPECIMEN: NORMAL

## 2020-04-21 PROCEDURE — 93005 ELECTROCARDIOGRAM TRACING: CPT | Performed by: EMERGENCY MEDICINE

## 2020-04-21 PROCEDURE — 25010000002 FUROSEMIDE PER 20 MG: Performed by: EMERGENCY MEDICINE

## 2020-04-21 PROCEDURE — 87040 BLOOD CULTURE FOR BACTERIA: CPT | Performed by: EMERGENCY MEDICINE

## 2020-04-21 PROCEDURE — 71250 CT THORAX DX C-: CPT

## 2020-04-21 PROCEDURE — 25010000002 HALOPERIDOL LACTATE PER 5 MG: Performed by: FAMILY MEDICINE

## 2020-04-21 PROCEDURE — 83615 LACTATE (LD) (LDH) ENZYME: CPT | Performed by: FAMILY MEDICINE

## 2020-04-21 PROCEDURE — 84484 ASSAY OF TROPONIN QUANT: CPT | Performed by: FAMILY MEDICINE

## 2020-04-21 PROCEDURE — U0003 INFECTIOUS AGENT DETECTION BY NUCLEIC ACID (DNA OR RNA); SEVERE ACUTE RESPIRATORY SYNDROME CORONAVIRUS 2 (SARS-COV-2) (CORONAVIRUS DISEASE [COVID-19]), AMPLIFIED PROBE TECHNIQUE, MAKING USE OF HIGH THROUGHPUT TECHNOLOGIES AS DESCRIBED BY CMS-2020-01-R: HCPCS | Performed by: FAMILY MEDICINE

## 2020-04-21 PROCEDURE — 84484 ASSAY OF TROPONIN QUANT: CPT | Performed by: EMERGENCY MEDICINE

## 2020-04-21 PROCEDURE — 85025 COMPLETE CBC W/AUTO DIFF WBC: CPT

## 2020-04-21 PROCEDURE — U0002 COVID-19 LAB TEST NON-CDC: HCPCS | Performed by: FAMILY MEDICINE

## 2020-04-21 PROCEDURE — 83690 ASSAY OF LIPASE: CPT | Performed by: EMERGENCY MEDICINE

## 2020-04-21 PROCEDURE — 99223 1ST HOSP IP/OBS HIGH 75: CPT | Performed by: FAMILY MEDICINE

## 2020-04-21 PROCEDURE — 71275 CT ANGIOGRAPHY CHEST: CPT

## 2020-04-21 PROCEDURE — 86140 C-REACTIVE PROTEIN: CPT | Performed by: FAMILY MEDICINE

## 2020-04-21 PROCEDURE — 80053 COMPREHEN METABOLIC PANEL: CPT | Performed by: EMERGENCY MEDICINE

## 2020-04-21 PROCEDURE — 71045 X-RAY EXAM CHEST 1 VIEW: CPT

## 2020-04-21 PROCEDURE — 85379 FIBRIN DEGRADATION QUANT: CPT | Performed by: FAMILY MEDICINE

## 2020-04-21 PROCEDURE — 85007 BL SMEAR W/DIFF WBC COUNT: CPT

## 2020-04-21 PROCEDURE — 25010000002 CEFTRIAXONE PER 250 MG: Performed by: EMERGENCY MEDICINE

## 2020-04-21 PROCEDURE — 99285 EMERGENCY DEPT VISIT HI MDM: CPT

## 2020-04-21 PROCEDURE — 82962 GLUCOSE BLOOD TEST: CPT

## 2020-04-21 PROCEDURE — 25010000002 AZITHROMYCIN PER 500 MG: Performed by: EMERGENCY MEDICINE

## 2020-04-21 PROCEDURE — 83605 ASSAY OF LACTIC ACID: CPT | Performed by: EMERGENCY MEDICINE

## 2020-04-21 PROCEDURE — 83880 ASSAY OF NATRIURETIC PEPTIDE: CPT | Performed by: EMERGENCY MEDICINE

## 2020-04-21 PROCEDURE — 93005 ELECTROCARDIOGRAM TRACING: CPT

## 2020-04-21 PROCEDURE — 84145 PROCALCITONIN (PCT): CPT | Performed by: FAMILY MEDICINE

## 2020-04-21 RX ORDER — AMOXICILLIN 250 MG
2 CAPSULE ORAL 2 TIMES DAILY PRN
Status: DISCONTINUED | OUTPATIENT
Start: 2020-04-21 | End: 2020-04-29 | Stop reason: HOSPADM

## 2020-04-21 RX ORDER — SODIUM CHLORIDE 0.9 % (FLUSH) 0.9 %
10 SYRINGE (ML) INJECTION EVERY 12 HOURS SCHEDULED
Status: DISCONTINUED | OUTPATIENT
Start: 2020-04-21 | End: 2020-04-21

## 2020-04-21 RX ORDER — NICOTINE POLACRILEX 4 MG
15 LOZENGE BUCCAL
Status: DISCONTINUED | OUTPATIENT
Start: 2020-04-21 | End: 2020-04-29 | Stop reason: HOSPADM

## 2020-04-21 RX ORDER — ACETAMINOPHEN 160 MG/5ML
650 SOLUTION ORAL EVERY 4 HOURS PRN
Status: DISCONTINUED | OUTPATIENT
Start: 2020-04-21 | End: 2020-04-29 | Stop reason: HOSPADM

## 2020-04-21 RX ORDER — ISOSORBIDE MONONITRATE 60 MG/1
60 TABLET, EXTENDED RELEASE ORAL EVERY MORNING
Status: DISCONTINUED | OUTPATIENT
Start: 2020-04-22 | End: 2020-04-23

## 2020-04-21 RX ORDER — ATORVASTATIN CALCIUM 40 MG/1
80 TABLET, FILM COATED ORAL NIGHTLY
Status: DISCONTINUED | OUTPATIENT
Start: 2020-04-21 | End: 2020-04-27

## 2020-04-21 RX ORDER — FUROSEMIDE 10 MG/ML
40 INJECTION INTRAMUSCULAR; INTRAVENOUS ONCE
Status: DISCONTINUED | OUTPATIENT
Start: 2020-04-22 | End: 2020-04-21

## 2020-04-21 RX ORDER — LISINOPRIL 5 MG/1
5 TABLET ORAL DAILY
Status: DISCONTINUED | OUTPATIENT
Start: 2020-04-21 | End: 2020-04-22

## 2020-04-21 RX ORDER — SODIUM CHLORIDE 0.9 % (FLUSH) 0.9 %
10 SYRINGE (ML) INJECTION AS NEEDED
Status: DISCONTINUED | OUTPATIENT
Start: 2020-04-21 | End: 2020-04-22

## 2020-04-21 RX ORDER — LEVOTHYROXINE SODIUM 0.03 MG/1
25 TABLET ORAL DAILY
Status: DISCONTINUED | OUTPATIENT
Start: 2020-04-21 | End: 2020-04-29 | Stop reason: HOSPADM

## 2020-04-21 RX ORDER — CHOLECALCIFEROL (VITAMIN D3) 125 MCG
5 CAPSULE ORAL NIGHTLY PRN
Status: DISCONTINUED | OUTPATIENT
Start: 2020-04-21 | End: 2020-04-29 | Stop reason: HOSPADM

## 2020-04-21 RX ORDER — LEVOTHYROXINE SODIUM 0.03 MG/1
25 TABLET ORAL DAILY
COMMUNITY

## 2020-04-21 RX ORDER — HALOPERIDOL 5 MG/ML
1 INJECTION INTRAMUSCULAR ONCE
Status: COMPLETED | OUTPATIENT
Start: 2020-04-21 | End: 2020-04-21

## 2020-04-21 RX ORDER — ASPIRIN 81 MG/1
81 TABLET, CHEWABLE ORAL DAILY
Status: DISCONTINUED | OUTPATIENT
Start: 2020-04-22 | End: 2020-04-29 | Stop reason: HOSPADM

## 2020-04-21 RX ORDER — ONDANSETRON 2 MG/ML
4 INJECTION INTRAMUSCULAR; INTRAVENOUS EVERY 6 HOURS PRN
Status: DISCONTINUED | OUTPATIENT
Start: 2020-04-21 | End: 2020-04-29 | Stop reason: HOSPADM

## 2020-04-21 RX ORDER — ACETAMINOPHEN 325 MG/1
650 TABLET ORAL EVERY 4 HOURS PRN
Status: DISCONTINUED | OUTPATIENT
Start: 2020-04-21 | End: 2020-04-29 | Stop reason: HOSPADM

## 2020-04-21 RX ORDER — SODIUM CHLORIDE 0.9 % (FLUSH) 0.9 %
10 SYRINGE (ML) INJECTION AS NEEDED
Status: DISCONTINUED | OUTPATIENT
Start: 2020-04-21 | End: 2020-04-29 | Stop reason: HOSPADM

## 2020-04-21 RX ORDER — FUROSEMIDE 10 MG/ML
40 INJECTION INTRAMUSCULAR; INTRAVENOUS ONCE
Status: COMPLETED | OUTPATIENT
Start: 2020-04-21 | End: 2020-04-21

## 2020-04-21 RX ORDER — QUETIAPINE FUMARATE 25 MG/1
12.5 TABLET, FILM COATED ORAL EVERY 8 HOURS PRN
Status: DISCONTINUED | OUTPATIENT
Start: 2020-04-21 | End: 2020-04-28

## 2020-04-21 RX ORDER — DEXTROSE MONOHYDRATE 25 G/50ML
25 INJECTION, SOLUTION INTRAVENOUS
Status: DISCONTINUED | OUTPATIENT
Start: 2020-04-21 | End: 2020-04-29 | Stop reason: HOSPADM

## 2020-04-21 RX ORDER — BISACODYL 10 MG
10 SUPPOSITORY, RECTAL RECTAL DAILY PRN
Status: DISCONTINUED | OUTPATIENT
Start: 2020-04-21 | End: 2020-04-29 | Stop reason: HOSPADM

## 2020-04-21 RX ORDER — BISACODYL 5 MG/1
5 TABLET, DELAYED RELEASE ORAL DAILY PRN
Status: DISCONTINUED | OUTPATIENT
Start: 2020-04-21 | End: 2020-04-29 | Stop reason: HOSPADM

## 2020-04-21 RX ORDER — ACETAMINOPHEN 650 MG/1
650 SUPPOSITORY RECTAL EVERY 4 HOURS PRN
Status: DISCONTINUED | OUTPATIENT
Start: 2020-04-21 | End: 2020-04-29 | Stop reason: HOSPADM

## 2020-04-21 RX ORDER — NITROGLYCERIN 400 UG/1
1 SPRAY ORAL
Status: DISCONTINUED | OUTPATIENT
Start: 2020-04-21 | End: 2020-04-29 | Stop reason: HOSPADM

## 2020-04-21 RX ORDER — ASPIRIN 81 MG/1
324 TABLET, CHEWABLE ORAL ONCE
Status: COMPLETED | OUTPATIENT
Start: 2020-04-21 | End: 2020-04-21

## 2020-04-21 RX ORDER — SODIUM CHLORIDE 0.9 % (FLUSH) 0.9 %
10 SYRINGE (ML) INJECTION EVERY 12 HOURS SCHEDULED
Status: DISCONTINUED | OUTPATIENT
Start: 2020-04-21 | End: 2020-04-29 | Stop reason: HOSPADM

## 2020-04-21 RX ADMIN — SODIUM CHLORIDE, PRESERVATIVE FREE 10 ML: 5 INJECTION INTRAVENOUS at 13:47

## 2020-04-21 RX ADMIN — CEFTRIAXONE 1 G: 1 INJECTION, POWDER, FOR SOLUTION INTRAMUSCULAR; INTRAVENOUS at 13:50

## 2020-04-21 RX ADMIN — QUETIAPINE FUMARATE 12.5 MG: 25 TABLET ORAL at 18:15

## 2020-04-21 RX ADMIN — FUROSEMIDE 40 MG: 10 INJECTION, SOLUTION INTRAMUSCULAR; INTRAVENOUS at 13:49

## 2020-04-21 RX ADMIN — HALOPERIDOL LACTATE 1 MG: 5 INJECTION, SOLUTION INTRAMUSCULAR at 22:07

## 2020-04-21 RX ADMIN — INSULIN LISPRO 4 UNITS: 100 INJECTION, SOLUTION INTRAVENOUS; SUBCUTANEOUS at 18:11

## 2020-04-21 RX ADMIN — SODIUM CHLORIDE, PRESERVATIVE FREE 10 ML: 5 INJECTION INTRAVENOUS at 13:55

## 2020-04-21 RX ADMIN — ASPIRIN 81 MG 324 MG: 81 TABLET ORAL at 11:53

## 2020-04-21 RX ADMIN — METOPROLOL TARTRATE 12.5 MG: 25 TABLET, FILM COATED ORAL at 20:02

## 2020-04-21 RX ADMIN — ATORVASTATIN CALCIUM 80 MG: 40 TABLET, FILM COATED ORAL at 20:01

## 2020-04-21 RX ADMIN — AZITHROMYCIN 500 MG: 500 INJECTION, POWDER, LYOPHILIZED, FOR SOLUTION INTRAVENOUS at 13:56

## 2020-04-21 RX ADMIN — SODIUM CHLORIDE 500 ML: 9 INJECTION, SOLUTION INTRAVENOUS at 20:54

## 2020-04-21 RX ADMIN — SODIUM CHLORIDE, PRESERVATIVE FREE 10 ML: 5 INJECTION INTRAVENOUS at 13:49

## 2020-04-21 NOTE — ED PROVIDER NOTES
Subjective   Mr. Torrse is sent from home by ambulance with a complaint of shortness of breath and cough.  He has dementia and is not able to provide much in the way of history.  His daughter tells me that he has had several days of shortness of breath and cough productive of bloody sputum.  He has had pain in his chest which he describes as sharp.  He had an appointment today with Dr. Peterson who is his cardiologist but decided to come to the emergency department.  He is not on home oxygen and on presentation today his saturations were in the low 80s on room air.  He was seen in the emergency department in [place] 2 days ago for a near syncopal episode and had negative work-up.  His daughter tells me he has been very restless and not sleeping well at night but feeling very fatigued during the day.  He has not had a fever.  He has history of CHF and COPD.  He is on Eliquis for atrial fibrillation.      History provided by:  Medical records and caregiver  History limited by:  Dementia  Shortness of Breath   Severity:  Severe  Onset quality:  Gradual  Timing:  Constant  Progression:  Worsening  Chronicity:  Recurrent  Context: activity    Relieved by:  Nothing  Worsened by:  Activity  Associated symptoms: chest pain, cough, hemoptysis and sputum production    Associated symptoms: no abdominal pain, no fever and no vomiting        Review of Systems   Unable to perform ROS: Dementia   Constitutional: Negative for fever.   Respiratory: Positive for cough, hemoptysis, sputum production and shortness of breath.    Cardiovascular: Positive for chest pain.   Gastrointestinal: Negative for abdominal pain and vomiting.       Past Medical History:   Diagnosis Date   • Bilateral renal cysts    • CKD (chronic kidney disease) stage 3, GFR 30-59 ml/min (CMS/[facility])    • COPD (chronic obstructive pulmonary disease) (CMS/[facility])    • DJD (degenerative joint disease), lumbar    • DM2 (diabetes mellitus, type 2) (CMS/[facility])    • Generalized  osteoarthritis    • HLD (hyperlipidemia)    • HTN (hypertension)    • Kidney stones    • PAF (paroxysmal atrial fibrillation) (CMS/HCC)    • Vascular dementia (CMS/HCC)        Allergies   Allergen Reactions   • Pollen Extract Unknown (See Comments)     rhinitis       Past Surgical History:   Procedure Laterality Date   • COLONOSCOPY         Family History   Problem Relation Age of Onset   • Heart attack Mother    • Heart attack Father        Social History     Socioeconomic History   • Marital status:      Spouse name: N/A   • Number of children: 6   • Years of education: Electrical training   • Highest education level: Associate degree: occupational, technical, or vocational program   Occupational History   • Occupation:      Employer: RETIRED   Tobacco Use   • Smoking status: Former Smoker     Packs/day: 1.00     Years: 15.00     Pack years: 15.00     Types: Cigarettes     Last attempt to quit: 10/25/1996     Years since quittin.5   • Smokeless tobacco: Never Used   Substance and Sexual Activity   • Alcohol use: No     Frequency: Never   • Drug use: No   • Sexual activity: Never     Partners: Female           Objective   Physical Exam   Constitutional: He appears well-developed and well-nourished. No distress.   He has been placed on 3 L oxygen prior to my exam and is comfortable on my exam   HENT:   Head: Normocephalic and atraumatic.   Eyes: Conjunctivae are normal. No scleral icterus.   Neck: Normal range of motion. Neck supple. JVD present.   Cardiovascular: Normal rate and regular rhythm.   No murmur heard.  Pulmonary/Chest: Effort normal and breath sounds normal. No stridor. No respiratory distress. He has no wheezes. He has no rales.   Abdominal: Soft. Bowel sounds are normal. He exhibits no distension. There is no tenderness.   Musculoskeletal: He exhibits no edema or tenderness.   Neurological: He is alert.   He is not oriented and not able to provide much history other than  regarding his immediate wellbeing   Skin: Skin is warm and dry. Capillary refill takes less than 2 seconds.   Psychiatric: He has a normal mood and affect. His behavior is normal.   Nursing note and vitals reviewed.      Procedures           ED Course  ED Course as of Apr 21 1253   Sun Apr 19, 2020 2008 EKG interpreted by me.  Atrial fibrillation.  Rate of 79.  Nonspecific ST segment T wave abnormalities.  Artifact present.  Abnormal EKG.    [CG]   2020 Discussed the plan with family, daughter is power of .   Patient received potassium due to hypokalemia.  She is coming to pick the patient up.    [CS]   Tue Apr 21, 2020   1252 Chest x-ray shows increased markings bilaterally.  Pulmonary edema versus pneumonia.  Will give Lasix and obtain CT of his chest    [DT]      ED Course User Index  [CG] Christopher Rabago DO  [CS] Broderick Black Jr., PA-C  [DT] Moe Lowe MD                                           [facility]    Final diagnoses:   Near syncope   Dementia with behavioral disturbance, unspecified dementia type (CMS/HCC)   Hypokalemia

## 2020-04-21 NOTE — TELEPHONE ENCOUNTER
Pt's daughter, [place] called and stated that pt has been coughing all night and is now coughing up blood.    Advised pt's daughter to take pt [facility] ER for evaluation.     Pt's daughter verbalizes understanding and agreeable to plan.

## 2020-04-22 LAB
ANION GAP SERPL CALCULATED.3IONS-SCNC: 22 MMOL/L (ref 5–15)
B PARAPERT DNA SPEC QL NAA+PROBE: NOT DETECTED
B PERT DNA SPEC QL NAA+PROBE: NOT DETECTED
BUN BLD-MCNC: 38 MG/DL (ref 8–23)
BUN/CREAT SERPL: 21.7 (ref 7–25)
C PNEUM DNA NPH QL NAA+NON-PROBE: NOT DETECTED
CALCIUM SPEC-SCNC: 8.5 MG/DL (ref 8.6–10.5)
CHLORIDE SERPL-SCNC: 98 MMOL/L (ref 98–107)
CHOLEST SERPL-MCNC: 107 MG/DL (ref 0–200)
CO2 SERPL-SCNC: 19 MMOL/L (ref 22–29)
CREAT BLD-MCNC: 1.75 MG/DL (ref 0.76–1.27)
D-LACTATE SERPL-SCNC: 4.3 MMOL/L (ref 0.5–2)
DEPRECATED RDW RBC AUTO: 57.5 FL (ref 37–54)
ERYTHROCYTE [DISTWIDTH] IN BLOOD BY AUTOMATED COUNT: 16.1 % (ref 12.3–15.4)
FLUAV H1 2009 PAND RNA NPH QL NAA+PROBE: NOT DETECTED
FLUAV H1 HA GENE NPH QL NAA+PROBE: NOT DETECTED
FLUAV H3 RNA NPH QL NAA+PROBE: NOT DETECTED
FLUAV SUBTYP SPEC NAA+PROBE: NOT DETECTED
FLUBV RNA ISLT QL NAA+PROBE: NOT DETECTED
GFR SERPL CREATININE-BSD FRML MDRD: 37 ML/MIN/1.73
GLUCOSE BLD-MCNC: 230 MG/DL (ref 65–99)
GLUCOSE BLDC GLUCOMTR-MCNC: 135 MG/DL (ref 70–130)
GLUCOSE BLDC GLUCOMTR-MCNC: 275 MG/DL (ref 70–130)
HADV DNA SPEC NAA+PROBE: NOT DETECTED
HBA1C MFR BLD: 8.2 % (ref 4.8–5.6)
HCOV 229E RNA SPEC QL NAA+PROBE: NOT DETECTED
HCOV HKU1 RNA SPEC QL NAA+PROBE: NOT DETECTED
HCOV NL63 RNA SPEC QL NAA+PROBE: NOT DETECTED
HCOV OC43 RNA SPEC QL NAA+PROBE: NOT DETECTED
HCT VFR BLD AUTO: 30.3 % (ref 37.5–51)
HDLC SERPL-MCNC: 49 MG/DL (ref 40–60)
HGB BLD-MCNC: 9.4 G/DL (ref 13–17.7)
HMPV RNA NPH QL NAA+NON-PROBE: NOT DETECTED
HPIV1 RNA SPEC QL NAA+PROBE: NOT DETECTED
HPIV2 RNA SPEC QL NAA+PROBE: NOT DETECTED
HPIV3 RNA NPH QL NAA+PROBE: NOT DETECTED
HPIV4 P GENE NPH QL NAA+PROBE: NOT DETECTED
LDLC SERPL CALC-MCNC: 43 MG/DL (ref 0–100)
LDLC/HDLC SERPL: 0.89 {RATIO}
M PNEUMO IGG SER IA-ACNC: NOT DETECTED
MAGNESIUM SERPL-MCNC: 1.1 MG/DL (ref 1.6–2.4)
MCH RBC QN AUTO: 30.8 PG (ref 26.6–33)
MCHC RBC AUTO-ENTMCNC: 31 G/DL (ref 31.5–35.7)
MCV RBC AUTO: 99.3 FL (ref 79–97)
PLATELET # BLD AUTO: 259 10*3/MM3 (ref 140–450)
PMV BLD AUTO: 10.8 FL (ref 6–12)
POTASSIUM BLD-SCNC: 3.6 MMOL/L (ref 3.5–5.2)
RBC # BLD AUTO: 3.05 10*6/MM3 (ref 4.14–5.8)
RHINOVIRUS RNA SPEC NAA+PROBE: NOT DETECTED
RSV RNA NPH QL NAA+NON-PROBE: NOT DETECTED
SODIUM BLD-SCNC: 139 MMOL/L (ref 136–145)
TRIGL SERPL-MCNC: 73 MG/DL (ref 0–150)
TROPONIN T SERPL-MCNC: 0.13 NG/ML (ref 0–0.03)
TSH SERPL DL<=0.05 MIU/L-ACNC: 5.92 UIU/ML (ref 0.27–4.2)
VLDLC SERPL-MCNC: 14.6 MG/DL
WBC NRBC COR # BLD: 9.39 10*3/MM3 (ref 3.4–10.8)

## 2020-04-22 PROCEDURE — 25010000002 HALOPERIDOL LACTATE PER 5 MG: Performed by: HOSPITALIST

## 2020-04-22 PROCEDURE — 93010 ELECTROCARDIOGRAM REPORT: CPT | Performed by: INTERNAL MEDICINE

## 2020-04-22 PROCEDURE — 99233 SBSQ HOSP IP/OBS HIGH 50: CPT | Performed by: INTERNAL MEDICINE

## 2020-04-22 PROCEDURE — 84484 ASSAY OF TROPONIN QUANT: CPT | Performed by: NURSE PRACTITIONER

## 2020-04-22 PROCEDURE — 0 IOPAMIDOL PER 1 ML: Performed by: FAMILY MEDICINE

## 2020-04-22 PROCEDURE — 25010000002 FUROSEMIDE PER 20 MG: Performed by: INTERNAL MEDICINE

## 2020-04-22 PROCEDURE — 93005 ELECTROCARDIOGRAM TRACING: CPT | Performed by: INTERNAL MEDICINE

## 2020-04-22 PROCEDURE — 25010000002 MAGNESIUM SULFATE 2 GM/50ML SOLUTION: Performed by: INTERNAL MEDICINE

## 2020-04-22 PROCEDURE — 82962 GLUCOSE BLOOD TEST: CPT

## 2020-04-22 PROCEDURE — 83036 HEMOGLOBIN GLYCOSYLATED A1C: CPT | Performed by: FAMILY MEDICINE

## 2020-04-22 PROCEDURE — 25010000002 CEFTRIAXONE PER 250 MG: Performed by: FAMILY MEDICINE

## 2020-04-22 PROCEDURE — 80061 LIPID PANEL: CPT | Performed by: FAMILY MEDICINE

## 2020-04-22 PROCEDURE — 83735 ASSAY OF MAGNESIUM: CPT | Performed by: FAMILY MEDICINE

## 2020-04-22 PROCEDURE — 83605 ASSAY OF LACTIC ACID: CPT | Performed by: FAMILY MEDICINE

## 2020-04-22 PROCEDURE — 80048 BASIC METABOLIC PNL TOTAL CA: CPT | Performed by: FAMILY MEDICINE

## 2020-04-22 PROCEDURE — 84443 ASSAY THYROID STIM HORMONE: CPT | Performed by: FAMILY MEDICINE

## 2020-04-22 PROCEDURE — 0100U HC BIOFIRE FILMARRAY RESP PANEL 2: CPT | Performed by: FAMILY MEDICINE

## 2020-04-22 PROCEDURE — 85027 COMPLETE CBC AUTOMATED: CPT | Performed by: FAMILY MEDICINE

## 2020-04-22 RX ORDER — FUROSEMIDE 10 MG/ML
40 INJECTION INTRAMUSCULAR; INTRAVENOUS ONCE
Status: COMPLETED | OUTPATIENT
Start: 2020-04-22 | End: 2020-04-22

## 2020-04-22 RX ORDER — DOXYCYCLINE 100 MG/1
100 CAPSULE ORAL EVERY 12 HOURS SCHEDULED
Status: DISCONTINUED | OUTPATIENT
Start: 2020-04-22 | End: 2020-04-24

## 2020-04-22 RX ORDER — POTASSIUM CHLORIDE 750 MG/1
20 CAPSULE, EXTENDED RELEASE ORAL ONCE
Status: COMPLETED | OUTPATIENT
Start: 2020-04-22 | End: 2020-04-22

## 2020-04-22 RX ORDER — MAGNESIUM SULFATE HEPTAHYDRATE 40 MG/ML
2 INJECTION, SOLUTION INTRAVENOUS AS NEEDED
Status: DISCONTINUED | OUTPATIENT
Start: 2020-04-22 | End: 2020-04-29 | Stop reason: HOSPADM

## 2020-04-22 RX ORDER — MAGNESIUM SULFATE HEPTAHYDRATE 40 MG/ML
4 INJECTION, SOLUTION INTRAVENOUS AS NEEDED
Status: DISCONTINUED | OUTPATIENT
Start: 2020-04-22 | End: 2020-04-29 | Stop reason: HOSPADM

## 2020-04-22 RX ORDER — HALOPERIDOL 5 MG/ML
1 INJECTION INTRAMUSCULAR ONCE
Status: COMPLETED | OUTPATIENT
Start: 2020-04-22 | End: 2020-04-22

## 2020-04-22 RX ADMIN — LEVOTHYROXINE SODIUM 25 MCG: 25 TABLET ORAL at 08:57

## 2020-04-22 RX ADMIN — SODIUM CHLORIDE, PRESERVATIVE FREE 10 ML: 5 INJECTION INTRAVENOUS at 20:17

## 2020-04-22 RX ADMIN — IOPAMIDOL 90 ML: 755 INJECTION, SOLUTION INTRAVENOUS at 00:00

## 2020-04-22 RX ADMIN — MELATONIN TAB 5 MG 5 MG: 5 TAB at 20:16

## 2020-04-22 RX ADMIN — ISOSORBIDE MONONITRATE 60 MG: 60 TABLET, EXTENDED RELEASE ORAL at 08:57

## 2020-04-22 RX ADMIN — SODIUM CHLORIDE 1 G: 900 INJECTION INTRAVENOUS at 08:58

## 2020-04-22 RX ADMIN — ISOSORBIDE MONONITRATE 60 MG: 60 TABLET, EXTENDED RELEASE ORAL at 05:42

## 2020-04-22 RX ADMIN — ATORVASTATIN CALCIUM 80 MG: 40 TABLET, FILM COATED ORAL at 20:17

## 2020-04-22 RX ADMIN — DOXYCYCLINE 100 MG: 100 CAPSULE ORAL at 08:57

## 2020-04-22 RX ADMIN — INSULIN LISPRO 3 UNITS: 100 INJECTION, SOLUTION INTRAVENOUS; SUBCUTANEOUS at 08:56

## 2020-04-22 RX ADMIN — QUETIAPINE FUMARATE 12.5 MG: 25 TABLET ORAL at 20:16

## 2020-04-22 RX ADMIN — MAGNESIUM SULFATE 2 G: 2 INJECTION INTRAVENOUS at 11:00

## 2020-04-22 RX ADMIN — POTASSIUM CHLORIDE 20 MEQ: 10 CAPSULE, COATED, EXTENDED RELEASE ORAL at 11:00

## 2020-04-22 RX ADMIN — FUROSEMIDE 40 MG: 10 INJECTION, SOLUTION INTRAMUSCULAR; INTRAVENOUS at 20:17

## 2020-04-22 RX ADMIN — INSULIN LISPRO 3 UNITS: 100 INJECTION, SOLUTION INTRAVENOUS; SUBCUTANEOUS at 17:19

## 2020-04-22 RX ADMIN — FUROSEMIDE 40 MG: 10 INJECTION, SOLUTION INTRAMUSCULAR; INTRAVENOUS at 08:57

## 2020-04-22 RX ADMIN — SODIUM CHLORIDE, PRESERVATIVE FREE 10 ML: 5 INJECTION INTRAVENOUS at 09:01

## 2020-04-22 RX ADMIN — MAGNESIUM SULFATE 2 G: 2 INJECTION INTRAVENOUS at 12:26

## 2020-04-22 RX ADMIN — MAGNESIUM SULFATE 2 G: 2 INJECTION INTRAVENOUS at 14:44

## 2020-04-22 RX ADMIN — METOPROLOL TARTRATE 12.5 MG: 25 TABLET, FILM COATED ORAL at 20:17

## 2020-04-22 RX ADMIN — ASPIRIN 81 MG 81 MG: 81 TABLET ORAL at 08:57

## 2020-04-22 RX ADMIN — METOPROLOL TARTRATE 12.5 MG: 25 TABLET, FILM COATED ORAL at 08:57

## 2020-04-22 RX ADMIN — HALOPERIDOL LACTATE 1 MG: 5 INJECTION, SOLUTION INTRAMUSCULAR at 19:58

## 2020-04-22 RX ADMIN — DOXYCYCLINE 100 MG: 100 CAPSULE ORAL at 20:17

## 2020-04-23 ENCOUNTER — APPOINTMENT (OUTPATIENT)
Dept: CARDIOLOGY | Facility: HOSPITAL | Age: 85
End: 2020-04-23

## 2020-04-23 LAB
ANION GAP SERPL CALCULATED.3IONS-SCNC: 15 MMOL/L (ref 5–15)
BH CV ECHO MEAS - AO MAX PG (FULL): 8.2 MMHG
BH CV ECHO MEAS - AO MAX PG: 11 MMHG
BH CV ECHO MEAS - AO MEAN PG (FULL): 4.5 MMHG
BH CV ECHO MEAS - AO MEAN PG: 6 MMHG
BH CV ECHO MEAS - AO ROOT AREA (BSA CORRECTED): 2
BH CV ECHO MEAS - AO ROOT AREA: 9.5 CM^2
BH CV ECHO MEAS - AO ROOT DIAM: 3.5 CM
BH CV ECHO MEAS - AO V2 MAX: 165.9 CM/SEC
BH CV ECHO MEAS - AO V2 MEAN: 114.3 CM/SEC
BH CV ECHO MEAS - AO V2 VTI: 33.4 CM
BH CV ECHO MEAS - ASC AORTA: 3.3 CM
BH CV ECHO MEAS - AVA(I,A): 1.5 CM^2
BH CV ECHO MEAS - AVA(I,D): 1.5 CM^2
BH CV ECHO MEAS - AVA(V,A): 1.5 CM^2
BH CV ECHO MEAS - AVA(V,D): 1.5 CM^2
BH CV ECHO MEAS - BSA(HAYCOCK): 1.7 M^2
BH CV ECHO MEAS - BSA: 1.8 M^2
BH CV ECHO MEAS - BZI_BMI: 20.2 KILOGRAMS/M^2
BH CV ECHO MEAS - BZI_METRIC_HEIGHT: 175.3 CM
BH CV ECHO MEAS - BZI_METRIC_WEIGHT: 62.1 KG
BH CV ECHO MEAS - EDV(CUBED): 82.7 ML
BH CV ECHO MEAS - EDV(TEICH): 85.7 ML
BH CV ECHO MEAS - EF(CUBED): 47.7 %
BH CV ECHO MEAS - EF(TEICH): 40.2 %
BH CV ECHO MEAS - ESV(CUBED): 43.2 ML
BH CV ECHO MEAS - ESV(TEICH): 51.2 ML
BH CV ECHO MEAS - FS: 19.4 %
BH CV ECHO MEAS - IVS/LVPW: 1.4
BH CV ECHO MEAS - IVSD: 1.3 CM
BH CV ECHO MEAS - LA DIMENSION: 4.2 CM
BH CV ECHO MEAS - LA/AO: 1.2
BH CV ECHO MEAS - LAD MAJOR: 6.3 CM
BH CV ECHO MEAS - LAT PEAK E' VEL: 7.2 CM/SEC
BH CV ECHO MEAS - LATERAL E/E' RATIO: 15
BH CV ECHO MEAS - LV MASS(C)D: 163 GRAMS
BH CV ECHO MEAS - LV MASS(C)DI: 92.6 GRAMS/M^2
BH CV ECHO MEAS - LV MAX PG: 2.8 MMHG
BH CV ECHO MEAS - LV MEAN PG: 1.5 MMHG
BH CV ECHO MEAS - LV V1 MAX: 84.4 CM/SEC
BH CV ECHO MEAS - LV V1 MEAN: 55 CM/SEC
BH CV ECHO MEAS - LV V1 VTI: 16.8 CM
BH CV ECHO MEAS - LVIDD: 4.4 CM
BH CV ECHO MEAS - LVIDS: 3.5 CM
BH CV ECHO MEAS - LVOT AREA (M): 3.1 CM^2
BH CV ECHO MEAS - LVOT AREA: 3 CM^2
BH CV ECHO MEAS - LVOT DIAM: 2 CM
BH CV ECHO MEAS - LVPWD: 0.91 CM
BH CV ECHO MEAS - MED PEAK E' VEL: 7 CM/SEC
BH CV ECHO MEAS - MEDIAL E/E' RATIO: 15.4
BH CV ECHO MEAS - MV DEC TIME: 0.16 SEC
BH CV ECHO MEAS - MV E MAX VEL: 110.6 CM/SEC
BH CV ECHO MEAS - PA ACC SLOPE: 918.6 CM/SEC^2
BH CV ECHO MEAS - PA ACC TIME: 0.1 SEC
BH CV ECHO MEAS - PA MAX PG: 6.7 MMHG
BH CV ECHO MEAS - PA PR(ACCEL): 36.2 MMHG
BH CV ECHO MEAS - PA V2 MAX: 129 CM/SEC
BH CV ECHO MEAS - RAP SYSTOLE: 8 MMHG
BH CV ECHO MEAS - RVDD: 1.9 CM
BH CV ECHO MEAS - RVSP: 42 MMHG
BH CV ECHO MEAS - SI(AO): 180.6 ML/M^2
BH CV ECHO MEAS - SI(CUBED): 22.4 ML/M^2
BH CV ECHO MEAS - SI(LVOT): 28.8 ML/M^2
BH CV ECHO MEAS - SI(TEICH): 19.6 ML/M^2
BH CV ECHO MEAS - SV(AO): 317.6 ML
BH CV ECHO MEAS - SV(CUBED): 39.5 ML
BH CV ECHO MEAS - SV(LVOT): 50.7 ML
BH CV ECHO MEAS - SV(TEICH): 34.5 ML
BH CV ECHO MEAS - TAPSE (>1.6): 1.3 CM2
BH CV ECHO MEAS - TR MAX PG: 34 MMHG
BH CV ECHO MEAS - TR MAX VEL: 288.1 CM/SEC
BH CV ECHO MEAS - TV MAX PG: 0.86 MMHG
BH CV ECHO MEAS - TV V2 MAX: 46.4 CM/SEC
BH CV ECHO MEASUREMENTS AVERAGE E/E' RATIO: 15.58
BH CV VAS BP RIGHT ARM: NORMAL MMHG
BH CV XLRA - RV BASE: 3.3 CM
BH CV XLRA - RV LENGTH: 6 CM
BH CV XLRA - RV MID: 3.1 CM
BUN BLD-MCNC: 46 MG/DL (ref 8–23)
BUN/CREAT SERPL: 24.1 (ref 7–25)
CALCIUM SPEC-SCNC: 8 MG/DL (ref 8.6–10.5)
CHLORIDE SERPL-SCNC: 97 MMOL/L (ref 98–107)
CO2 SERPL-SCNC: 24 MMOL/L (ref 22–29)
CREAT BLD-MCNC: 1.91 MG/DL (ref 0.76–1.27)
DEPRECATED RDW RBC AUTO: 54.7 FL (ref 37–54)
ERYTHROCYTE [DISTWIDTH] IN BLOOD BY AUTOMATED COUNT: 16.1 % (ref 12.3–15.4)
GFR SERPL CREATININE-BSD FRML MDRD: 33 ML/MIN/1.73
GLUCOSE BLD-MCNC: 185 MG/DL (ref 65–99)
GLUCOSE BLDC GLUCOMTR-MCNC: 141 MG/DL (ref 70–130)
GLUCOSE BLDC GLUCOMTR-MCNC: 200 MG/DL (ref 70–130)
GLUCOSE BLDC GLUCOMTR-MCNC: 242 MG/DL (ref 70–130)
GLUCOSE BLDC GLUCOMTR-MCNC: 88 MG/DL (ref 70–130)
GLUCOSE BLDC GLUCOMTR-MCNC: 98 MG/DL (ref 70–130)
HCT VFR BLD AUTO: 22.8 % (ref 37.5–51)
HGB BLD-MCNC: 7.4 G/DL (ref 13–17.7)
LEFT ATRIUM VOLUME INDEX: 35.2 ML/M2
MAGNESIUM SERPL-MCNC: 2.5 MG/DL (ref 1.6–2.4)
MCH RBC QN AUTO: 30.7 PG (ref 26.6–33)
MCHC RBC AUTO-ENTMCNC: 32.5 G/DL (ref 31.5–35.7)
MCV RBC AUTO: 94.6 FL (ref 79–97)
PLATELET # BLD AUTO: 211 10*3/MM3 (ref 140–450)
PMV BLD AUTO: 10.7 FL (ref 6–12)
POTASSIUM BLD-SCNC: 3.1 MMOL/L (ref 3.5–5.2)
PROCALCITONIN SERPL-MCNC: 0.16 NG/ML (ref 0.1–0.25)
RBC # BLD AUTO: 2.41 10*6/MM3 (ref 4.14–5.8)
REF LAB TEST METHOD: NORMAL
SARS-COV-2 RNA RESP QL NAA+PROBE: NOT DETECTED
SODIUM BLD-SCNC: 136 MMOL/L (ref 136–145)
WBC NRBC COR # BLD: 7.24 10*3/MM3 (ref 3.4–10.8)

## 2020-04-23 PROCEDURE — 85027 COMPLETE CBC AUTOMATED: CPT | Performed by: INTERNAL MEDICINE

## 2020-04-23 PROCEDURE — 25010000002 CEFTRIAXONE PER 250 MG: Performed by: FAMILY MEDICINE

## 2020-04-23 PROCEDURE — 93306 TTE W/DOPPLER COMPLETE: CPT | Performed by: INTERNAL MEDICINE

## 2020-04-23 PROCEDURE — 25010000002 LORAZEPAM PER 2 MG: Performed by: NURSE PRACTITIONER

## 2020-04-23 PROCEDURE — 83735 ASSAY OF MAGNESIUM: CPT | Performed by: INTERNAL MEDICINE

## 2020-04-23 PROCEDURE — 80048 BASIC METABOLIC PNL TOTAL CA: CPT | Performed by: INTERNAL MEDICINE

## 2020-04-23 PROCEDURE — 99232 SBSQ HOSP IP/OBS MODERATE 35: CPT | Performed by: INTERNAL MEDICINE

## 2020-04-23 PROCEDURE — 84145 PROCALCITONIN (PCT): CPT

## 2020-04-23 PROCEDURE — 82962 GLUCOSE BLOOD TEST: CPT

## 2020-04-23 PROCEDURE — 25010000002 HALOPERIDOL LACTATE PER 5 MG: Performed by: NURSE PRACTITIONER

## 2020-04-23 PROCEDURE — 93306 TTE W/DOPPLER COMPLETE: CPT

## 2020-04-23 PROCEDURE — 99222 1ST HOSP IP/OBS MODERATE 55: CPT | Performed by: INTERNAL MEDICINE

## 2020-04-23 RX ORDER — PANTOPRAZOLE SODIUM 40 MG/1
40 TABLET, DELAYED RELEASE ORAL
Status: DISCONTINUED | OUTPATIENT
Start: 2020-04-24 | End: 2020-04-29 | Stop reason: HOSPADM

## 2020-04-23 RX ORDER — POTASSIUM CHLORIDE 750 MG/1
40 CAPSULE, EXTENDED RELEASE ORAL AS NEEDED
Status: DISCONTINUED | OUTPATIENT
Start: 2020-04-23 | End: 2020-04-29 | Stop reason: HOSPADM

## 2020-04-23 RX ORDER — POTASSIUM CHLORIDE 7.45 MG/ML
10 INJECTION INTRAVENOUS
Status: DISCONTINUED | OUTPATIENT
Start: 2020-04-23 | End: 2020-04-29 | Stop reason: HOSPADM

## 2020-04-23 RX ORDER — ISOSORBIDE MONONITRATE 30 MG/1
30 TABLET, EXTENDED RELEASE ORAL EVERY MORNING
Status: DISCONTINUED | OUTPATIENT
Start: 2020-04-24 | End: 2020-04-25

## 2020-04-23 RX ORDER — HALOPERIDOL 5 MG/ML
1 INJECTION INTRAMUSCULAR ONCE
Status: COMPLETED | OUTPATIENT
Start: 2020-04-23 | End: 2020-04-23

## 2020-04-23 RX ORDER — LORAZEPAM 2 MG/ML
0.25 INJECTION INTRAMUSCULAR ONCE
Status: COMPLETED | OUTPATIENT
Start: 2020-04-23 | End: 2020-04-23

## 2020-04-23 RX ORDER — POTASSIUM CHLORIDE 1.5 G/1.77G
40 POWDER, FOR SOLUTION ORAL AS NEEDED
Status: DISCONTINUED | OUTPATIENT
Start: 2020-04-23 | End: 2020-04-29 | Stop reason: HOSPADM

## 2020-04-23 RX ORDER — QUETIAPINE FUMARATE 25 MG/1
25 TABLET, FILM COATED ORAL NIGHTLY
Status: DISCONTINUED | OUTPATIENT
Start: 2020-04-23 | End: 2020-04-29 | Stop reason: HOSPADM

## 2020-04-23 RX ADMIN — ATORVASTATIN CALCIUM 80 MG: 40 TABLET, FILM COATED ORAL at 20:05

## 2020-04-23 RX ADMIN — METOPROLOL TARTRATE 12.5 MG: 25 TABLET, FILM COATED ORAL at 20:05

## 2020-04-23 RX ADMIN — HALOPERIDOL LACTATE 1 MG: 5 INJECTION, SOLUTION INTRAMUSCULAR at 00:13

## 2020-04-23 RX ADMIN — SODIUM CHLORIDE, PRESERVATIVE FREE 10 ML: 5 INJECTION INTRAVENOUS at 09:11

## 2020-04-23 RX ADMIN — DOXYCYCLINE 100 MG: 100 CAPSULE ORAL at 20:05

## 2020-04-23 RX ADMIN — BISACODYL 5 MG: 5 TABLET, COATED ORAL at 20:06

## 2020-04-23 RX ADMIN — LORAZEPAM 0.25 MG: 2 INJECTION INTRAMUSCULAR; INTRAVENOUS at 02:43

## 2020-04-23 RX ADMIN — SODIUM CHLORIDE 1 G: 900 INJECTION INTRAVENOUS at 09:10

## 2020-04-23 RX ADMIN — SODIUM CHLORIDE, PRESERVATIVE FREE 10 ML: 5 INJECTION INTRAVENOUS at 20:06

## 2020-04-23 RX ADMIN — INSULIN LISPRO 3 UNITS: 100 INJECTION, SOLUTION INTRAVENOUS; SUBCUTANEOUS at 09:01

## 2020-04-23 RX ADMIN — QUETIAPINE FUMARATE 25 MG: 25 TABLET ORAL at 20:05

## 2020-04-24 LAB
ANION GAP SERPL CALCULATED.3IONS-SCNC: 16 MMOL/L (ref 5–15)
BUN BLD-MCNC: 39 MG/DL (ref 8–23)
BUN/CREAT SERPL: 23.5 (ref 7–25)
CALCIUM SPEC-SCNC: 8.8 MG/DL (ref 8.6–10.5)
CHLORIDE SERPL-SCNC: 102 MMOL/L (ref 98–107)
CHROMATIN AB SERPL-ACNC: <10 IU/ML (ref 0–14)
CO2 SERPL-SCNC: 24 MMOL/L (ref 22–29)
CREAT BLD-MCNC: 1.66 MG/DL (ref 0.76–1.27)
GFR SERPL CREATININE-BSD FRML MDRD: 39 ML/MIN/1.73
GLUCOSE BLD-MCNC: 169 MG/DL (ref 65–99)
GLUCOSE BLDC GLUCOMTR-MCNC: 131 MG/DL (ref 70–130)
GLUCOSE BLDC GLUCOMTR-MCNC: 188 MG/DL (ref 70–130)
GLUCOSE BLDC GLUCOMTR-MCNC: 305 MG/DL (ref 70–130)
GLUCOSE BLDC GLUCOMTR-MCNC: 312 MG/DL (ref 70–130)
MAGNESIUM SERPL-MCNC: 2.1 MG/DL (ref 1.6–2.4)
POTASSIUM BLD-SCNC: 2.9 MMOL/L (ref 3.5–5.2)
SODIUM BLD-SCNC: 142 MMOL/L (ref 136–145)

## 2020-04-24 PROCEDURE — 86431 RHEUMATOID FACTOR QUANT: CPT | Performed by: INTERNAL MEDICINE

## 2020-04-24 PROCEDURE — 25010000002 METHYLPREDNISOLONE PER 125 MG: Performed by: INTERNAL MEDICINE

## 2020-04-24 PROCEDURE — 99233 SBSQ HOSP IP/OBS HIGH 50: CPT | Performed by: INTERNAL MEDICINE

## 2020-04-24 PROCEDURE — 99231 SBSQ HOSP IP/OBS SF/LOW 25: CPT | Performed by: INTERNAL MEDICINE

## 2020-04-24 PROCEDURE — 97162 PT EVAL MOD COMPLEX 30 MIN: CPT

## 2020-04-24 PROCEDURE — 25010000002 CEFTRIAXONE PER 250 MG: Performed by: FAMILY MEDICINE

## 2020-04-24 PROCEDURE — 97165 OT EVAL LOW COMPLEX 30 MIN: CPT

## 2020-04-24 PROCEDURE — 94640 AIRWAY INHALATION TREATMENT: CPT

## 2020-04-24 PROCEDURE — 83735 ASSAY OF MAGNESIUM: CPT | Performed by: INTERNAL MEDICINE

## 2020-04-24 PROCEDURE — 82962 GLUCOSE BLOOD TEST: CPT

## 2020-04-24 PROCEDURE — 80048 BASIC METABOLIC PNL TOTAL CA: CPT | Performed by: INTERNAL MEDICINE

## 2020-04-24 PROCEDURE — 94799 UNLISTED PULMONARY SVC/PX: CPT

## 2020-04-24 PROCEDURE — 25010000003 POTASSIUM CHLORIDE 10 MEQ/100ML SOLUTION: Performed by: INTERNAL MEDICINE

## 2020-04-24 PROCEDURE — 99232 SBSQ HOSP IP/OBS MODERATE 35: CPT | Performed by: INTERNAL MEDICINE

## 2020-04-24 RX ORDER — IPRATROPIUM BROMIDE AND ALBUTEROL SULFATE 2.5; .5 MG/3ML; MG/3ML
3 SOLUTION RESPIRATORY (INHALATION)
Status: DISCONTINUED | OUTPATIENT
Start: 2020-04-24 | End: 2020-04-28

## 2020-04-24 RX ORDER — METHYLPREDNISOLONE SODIUM SUCCINATE 125 MG/2ML
60 INJECTION, POWDER, LYOPHILIZED, FOR SOLUTION INTRAMUSCULAR; INTRAVENOUS EVERY 24 HOURS
Status: DISCONTINUED | OUTPATIENT
Start: 2020-04-24 | End: 2020-04-27

## 2020-04-24 RX ADMIN — BISACODYL 5 MG: 5 TABLET, COATED ORAL at 20:13

## 2020-04-24 RX ADMIN — SODIUM CHLORIDE, PRESERVATIVE FREE 10 ML: 5 INJECTION INTRAVENOUS at 20:12

## 2020-04-24 RX ADMIN — IPRATROPIUM BROMIDE AND ALBUTEROL SULFATE 3 ML: 2.5; .5 SOLUTION RESPIRATORY (INHALATION) at 12:11

## 2020-04-24 RX ADMIN — METOPROLOL TARTRATE 12.5 MG: 25 TABLET, FILM COATED ORAL at 20:13

## 2020-04-24 RX ADMIN — IPRATROPIUM BROMIDE AND ALBUTEROL SULFATE 3 ML: 2.5; .5 SOLUTION RESPIRATORY (INHALATION) at 19:27

## 2020-04-24 RX ADMIN — POTASSIUM CHLORIDE 10 MEQ: 7.46 INJECTION, SOLUTION INTRAVENOUS at 20:13

## 2020-04-24 RX ADMIN — POTASSIUM CHLORIDE 10 MEQ: 7.46 INJECTION, SOLUTION INTRAVENOUS at 13:31

## 2020-04-24 RX ADMIN — ACETAMINOPHEN ORAL SOLUTION 650 MG: 650 SOLUTION ORAL at 17:15

## 2020-04-24 RX ADMIN — POTASSIUM CHLORIDE 10 MEQ: 7.46 INJECTION, SOLUTION INTRAVENOUS at 17:26

## 2020-04-24 RX ADMIN — SODIUM CHLORIDE 1 G: 900 INJECTION INTRAVENOUS at 08:10

## 2020-04-24 RX ADMIN — INSULIN LISPRO 2 UNITS: 100 INJECTION, SOLUTION INTRAVENOUS; SUBCUTANEOUS at 08:11

## 2020-04-24 RX ADMIN — DOCUSATE SODIUM 50MG AND SENNOSIDES 8.6MG 2 TABLET: 8.6; 5 TABLET, FILM COATED ORAL at 20:13

## 2020-04-24 RX ADMIN — POTASSIUM CHLORIDE 10 MEQ: 7.46 INJECTION, SOLUTION INTRAVENOUS at 21:17

## 2020-04-24 RX ADMIN — ATORVASTATIN CALCIUM 80 MG: 40 TABLET, FILM COATED ORAL at 20:13

## 2020-04-24 RX ADMIN — QUETIAPINE FUMARATE 25 MG: 25 TABLET ORAL at 20:13

## 2020-04-24 RX ADMIN — METHYLPREDNISOLONE SODIUM SUCCINATE 60 MG: 125 INJECTION, POWDER, FOR SOLUTION INTRAMUSCULAR; INTRAVENOUS at 11:59

## 2020-04-24 RX ADMIN — POTASSIUM CHLORIDE 10 MEQ: 7.46 INJECTION, SOLUTION INTRAVENOUS at 12:00

## 2020-04-24 RX ADMIN — DOXYCYCLINE 100 MG: 100 INJECTION, POWDER, LYOPHILIZED, FOR SOLUTION INTRAVENOUS at 21:21

## 2020-04-24 RX ADMIN — POTASSIUM CHLORIDE 10 MEQ: 7.46 INJECTION, SOLUTION INTRAVENOUS at 15:41

## 2020-04-24 RX ADMIN — INSULIN LISPRO 5 UNITS: 100 INJECTION, SOLUTION INTRAVENOUS; SUBCUTANEOUS at 17:16

## 2020-04-24 RX ADMIN — SODIUM CHLORIDE, PRESERVATIVE FREE 10 ML: 5 INJECTION INTRAVENOUS at 08:12

## 2020-04-25 LAB
ANION GAP SERPL CALCULATED.3IONS-SCNC: 17 MMOL/L (ref 5–15)
BASOPHILS # BLD AUTO: 0 10*3/MM3 (ref 0–0.2)
BASOPHILS NFR BLD AUTO: 0 % (ref 0–1.5)
BUN BLD-MCNC: 39 MG/DL (ref 8–23)
BUN/CREAT SERPL: 24.7 (ref 7–25)
CALCIUM SPEC-SCNC: 8.8 MG/DL (ref 8.6–10.5)
CHLORIDE SERPL-SCNC: 102 MMOL/L (ref 98–107)
CO2 SERPL-SCNC: 23 MMOL/L (ref 22–29)
CREAT BLD-MCNC: 1.58 MG/DL (ref 0.76–1.27)
DEPRECATED RDW RBC AUTO: 57.1 FL (ref 37–54)
EOSINOPHIL # BLD AUTO: 0 10*3/MM3 (ref 0–0.4)
EOSINOPHIL NFR BLD AUTO: 0 % (ref 0.3–6.2)
ERYTHROCYTE [DISTWIDTH] IN BLOOD BY AUTOMATED COUNT: 16.4 % (ref 12.3–15.4)
GFR SERPL CREATININE-BSD FRML MDRD: 42 ML/MIN/1.73
GLUCOSE BLD-MCNC: 318 MG/DL (ref 65–99)
GLUCOSE BLDC GLUCOMTR-MCNC: 135 MG/DL (ref 70–130)
GLUCOSE BLDC GLUCOMTR-MCNC: 258 MG/DL (ref 70–130)
GLUCOSE BLDC GLUCOMTR-MCNC: 331 MG/DL (ref 70–130)
HCT VFR BLD AUTO: 28 % (ref 37.5–51)
HGB BLD-MCNC: 8.8 G/DL (ref 13–17.7)
IMM GRANULOCYTES # BLD AUTO: 0.05 10*3/MM3 (ref 0–0.05)
IMM GRANULOCYTES NFR BLD AUTO: 0.8 % (ref 0–0.5)
LYMPHOCYTES # BLD AUTO: 0.53 10*3/MM3 (ref 0.7–3.1)
LYMPHOCYTES NFR BLD AUTO: 8.4 % (ref 19.6–45.3)
MCH RBC QN AUTO: 30.8 PG (ref 26.6–33)
MCHC RBC AUTO-ENTMCNC: 31.4 G/DL (ref 31.5–35.7)
MCV RBC AUTO: 97.9 FL (ref 79–97)
MONOCYTES # BLD AUTO: 0.4 10*3/MM3 (ref 0.1–0.9)
MONOCYTES NFR BLD AUTO: 6.3 % (ref 5–12)
NEUTROPHILS # BLD AUTO: 5.32 10*3/MM3 (ref 1.7–7)
NEUTROPHILS NFR BLD AUTO: 84.5 % (ref 42.7–76)
NRBC BLD AUTO-RTO: 0 /100 WBC (ref 0–0.2)
PLATELET # BLD AUTO: 253 10*3/MM3 (ref 140–450)
PMV BLD AUTO: 10.4 FL (ref 6–12)
POTASSIUM BLD-SCNC: 3.6 MMOL/L (ref 3.5–5.2)
POTASSIUM BLD-SCNC: 4.2 MMOL/L (ref 3.5–5.2)
RBC # BLD AUTO: 2.86 10*6/MM3 (ref 4.14–5.8)
SODIUM BLD-SCNC: 142 MMOL/L (ref 136–145)
WBC NRBC COR # BLD: 6.3 10*3/MM3 (ref 3.4–10.8)

## 2020-04-25 PROCEDURE — 63710000001 INSULIN DETEMIR PER 5 UNITS: Performed by: INTERNAL MEDICINE

## 2020-04-25 PROCEDURE — 86235 NUCLEAR ANTIGEN ANTIBODY: CPT

## 2020-04-25 PROCEDURE — 25010000002 METHYLPREDNISOLONE PER 125 MG: Performed by: INTERNAL MEDICINE

## 2020-04-25 PROCEDURE — 99232 SBSQ HOSP IP/OBS MODERATE 35: CPT | Performed by: INTERNAL MEDICINE

## 2020-04-25 PROCEDURE — 63710000001 INSULIN LISPRO (HUMAN) PER 5 UNITS: Performed by: FAMILY MEDICINE

## 2020-04-25 PROCEDURE — 85025 COMPLETE CBC W/AUTO DIFF WBC: CPT | Performed by: INTERNAL MEDICINE

## 2020-04-25 PROCEDURE — 94799 UNLISTED PULMONARY SVC/PX: CPT

## 2020-04-25 PROCEDURE — 80048 BASIC METABOLIC PNL TOTAL CA: CPT | Performed by: INTERNAL MEDICINE

## 2020-04-25 PROCEDURE — 86038 ANTINUCLEAR ANTIBODIES: CPT | Performed by: INTERNAL MEDICINE

## 2020-04-25 PROCEDURE — 82962 GLUCOSE BLOOD TEST: CPT

## 2020-04-25 PROCEDURE — 25010000002 CEFTRIAXONE PER 250 MG: Performed by: INTERNAL MEDICINE

## 2020-04-25 PROCEDURE — 84132 ASSAY OF SERUM POTASSIUM: CPT | Performed by: INTERNAL MEDICINE

## 2020-04-25 PROCEDURE — 25010000003 POTASSIUM CHLORIDE 10 MEQ/100ML SOLUTION: Performed by: INTERNAL MEDICINE

## 2020-04-25 PROCEDURE — 86225 DNA ANTIBODY NATIVE: CPT | Performed by: INTERNAL MEDICINE

## 2020-04-25 RX ORDER — ISOSORBIDE MONONITRATE 20 MG/1
10 TABLET ORAL
Status: DISCONTINUED | OUTPATIENT
Start: 2020-04-25 | End: 2020-04-29 | Stop reason: HOSPADM

## 2020-04-25 RX ADMIN — IPRATROPIUM BROMIDE AND ALBUTEROL SULFATE 3 ML: 2.5; .5 SOLUTION RESPIRATORY (INHALATION) at 12:24

## 2020-04-25 RX ADMIN — METOPROLOL TARTRATE 12.5 MG: 25 TABLET, FILM COATED ORAL at 22:02

## 2020-04-25 RX ADMIN — DOCUSATE SODIUM 50MG AND SENNOSIDES 8.6MG 2 TABLET: 8.6; 5 TABLET, FILM COATED ORAL at 09:12

## 2020-04-25 RX ADMIN — POTASSIUM CHLORIDE 10 MEQ: 7.46 INJECTION, SOLUTION INTRAVENOUS at 14:56

## 2020-04-25 RX ADMIN — INSULIN DETEMIR 5 UNITS: 100 INJECTION, SOLUTION SUBCUTANEOUS at 10:01

## 2020-04-25 RX ADMIN — ACETAMINOPHEN ORAL SOLUTION 650 MG: 650 SOLUTION ORAL at 09:12

## 2020-04-25 RX ADMIN — METOPROLOL TARTRATE 12.5 MG: 25 TABLET, FILM COATED ORAL at 09:12

## 2020-04-25 RX ADMIN — ASPIRIN 81 MG 81 MG: 81 TABLET ORAL at 09:12

## 2020-04-25 RX ADMIN — IPRATROPIUM BROMIDE AND ALBUTEROL SULFATE 3 ML: 2.5; .5 SOLUTION RESPIRATORY (INHALATION) at 18:58

## 2020-04-25 RX ADMIN — DOXYCYCLINE 100 MG: 100 INJECTION, POWDER, LYOPHILIZED, FOR SOLUTION INTRAVENOUS at 20:58

## 2020-04-25 RX ADMIN — SODIUM CHLORIDE, PRESERVATIVE FREE 10 ML: 5 INJECTION INTRAVENOUS at 09:14

## 2020-04-25 RX ADMIN — POTASSIUM CHLORIDE 10 MEQ: 7.46 INJECTION, SOLUTION INTRAVENOUS at 16:23

## 2020-04-25 RX ADMIN — DOXYCYCLINE 100 MG: 100 INJECTION, POWDER, LYOPHILIZED, FOR SOLUTION INTRAVENOUS at 10:02

## 2020-04-25 RX ADMIN — QUETIAPINE FUMARATE 25 MG: 25 TABLET ORAL at 22:01

## 2020-04-25 RX ADMIN — POTASSIUM CHLORIDE 10 MEQ: 7.46 INJECTION, SOLUTION INTRAVENOUS at 18:42

## 2020-04-25 RX ADMIN — METHYLPREDNISOLONE SODIUM SUCCINATE 60 MG: 125 INJECTION, POWDER, FOR SOLUTION INTRAMUSCULAR; INTRAVENOUS at 11:58

## 2020-04-25 RX ADMIN — POTASSIUM CHLORIDE 10 MEQ: 7.46 INJECTION, SOLUTION INTRAVENOUS at 17:16

## 2020-04-25 RX ADMIN — BISACODYL 10 MG: 10 SUPPOSITORY RECTAL at 11:39

## 2020-04-25 RX ADMIN — SODIUM CHLORIDE 1 G: 900 INJECTION INTRAVENOUS at 09:06

## 2020-04-25 RX ADMIN — INSULIN LISPRO 5 UNITS: 100 INJECTION, SOLUTION INTRAVENOUS; SUBCUTANEOUS at 09:13

## 2020-04-25 RX ADMIN — INSULIN LISPRO 4 UNITS: 100 INJECTION, SOLUTION INTRAVENOUS; SUBCUTANEOUS at 11:57

## 2020-04-25 RX ADMIN — IPRATROPIUM BROMIDE AND ALBUTEROL SULFATE 3 ML: 2.5; .5 SOLUTION RESPIRATORY (INHALATION) at 06:26

## 2020-04-25 RX ADMIN — ATORVASTATIN CALCIUM 80 MG: 40 TABLET, FILM COATED ORAL at 22:02

## 2020-04-25 RX ADMIN — LEVOTHYROXINE SODIUM 25 MCG: 25 TABLET ORAL at 09:12

## 2020-04-25 RX ADMIN — SODIUM CHLORIDE, PRESERVATIVE FREE 10 ML: 5 INJECTION INTRAVENOUS at 20:59

## 2020-04-26 LAB
ANION GAP SERPL CALCULATED.3IONS-SCNC: 17 MMOL/L (ref 5–15)
BACTERIA SPEC AEROBE CULT: NORMAL
BACTERIA SPEC AEROBE CULT: NORMAL
BASOPHILS # BLD AUTO: 0.01 10*3/MM3 (ref 0–0.2)
BASOPHILS NFR BLD AUTO: 0.1 % (ref 0–1.5)
BUN BLD-MCNC: 34 MG/DL (ref 8–23)
BUN/CREAT SERPL: 24.6 (ref 7–25)
CALCIUM SPEC-SCNC: 9.5 MG/DL (ref 8.6–10.5)
CHLORIDE SERPL-SCNC: 105 MMOL/L (ref 98–107)
CO2 SERPL-SCNC: 22 MMOL/L (ref 22–29)
CREAT BLD-MCNC: 1.38 MG/DL (ref 0.76–1.27)
DEPRECATED RDW RBC AUTO: 57.7 FL (ref 37–54)
EOSINOPHIL # BLD AUTO: 0 10*3/MM3 (ref 0–0.4)
EOSINOPHIL NFR BLD AUTO: 0 % (ref 0.3–6.2)
ERYTHROCYTE [DISTWIDTH] IN BLOOD BY AUTOMATED COUNT: 16.8 % (ref 12.3–15.4)
GFR SERPL CREATININE-BSD FRML MDRD: 49 ML/MIN/1.73
GLUCOSE BLD-MCNC: 242 MG/DL (ref 65–99)
GLUCOSE BLDC GLUCOMTR-MCNC: 117 MG/DL (ref 70–130)
GLUCOSE BLDC GLUCOMTR-MCNC: 234 MG/DL (ref 70–130)
GLUCOSE BLDC GLUCOMTR-MCNC: 253 MG/DL (ref 70–130)
GLUCOSE BLDC GLUCOMTR-MCNC: 90 MG/DL (ref 70–130)
HCT VFR BLD AUTO: 30.6 % (ref 37.5–51)
HGB BLD-MCNC: 9.6 G/DL (ref 13–17.7)
IMM GRANULOCYTES # BLD AUTO: 0.06 10*3/MM3 (ref 0–0.05)
IMM GRANULOCYTES NFR BLD AUTO: 0.6 % (ref 0–0.5)
LYMPHOCYTES # BLD AUTO: 0.66 10*3/MM3 (ref 0.7–3.1)
LYMPHOCYTES NFR BLD AUTO: 6.1 % (ref 19.6–45.3)
MCH RBC QN AUTO: 30.8 PG (ref 26.6–33)
MCHC RBC AUTO-ENTMCNC: 31.4 G/DL (ref 31.5–35.7)
MCV RBC AUTO: 98.1 FL (ref 79–97)
MONOCYTES # BLD AUTO: 0.51 10*3/MM3 (ref 0.1–0.9)
MONOCYTES NFR BLD AUTO: 4.7 % (ref 5–12)
NEUTROPHILS # BLD AUTO: 9.53 10*3/MM3 (ref 1.7–7)
NEUTROPHILS NFR BLD AUTO: 88.5 % (ref 42.7–76)
NRBC BLD AUTO-RTO: 0 /100 WBC (ref 0–0.2)
PLATELET # BLD AUTO: 284 10*3/MM3 (ref 140–450)
PMV BLD AUTO: 10.5 FL (ref 6–12)
POTASSIUM BLD-SCNC: 4.2 MMOL/L (ref 3.5–5.2)
RBC # BLD AUTO: 3.12 10*6/MM3 (ref 4.14–5.8)
SODIUM BLD-SCNC: 144 MMOL/L (ref 136–145)
WBC NRBC COR # BLD: 10.77 10*3/MM3 (ref 3.4–10.8)

## 2020-04-26 PROCEDURE — 63710000001 INSULIN DETEMIR PER 5 UNITS: Performed by: INTERNAL MEDICINE

## 2020-04-26 PROCEDURE — 92610 EVALUATE SWALLOWING FUNCTION: CPT

## 2020-04-26 PROCEDURE — 99232 SBSQ HOSP IP/OBS MODERATE 35: CPT | Performed by: INTERNAL MEDICINE

## 2020-04-26 PROCEDURE — 82962 GLUCOSE BLOOD TEST: CPT

## 2020-04-26 PROCEDURE — 80048 BASIC METABOLIC PNL TOTAL CA: CPT | Performed by: INTERNAL MEDICINE

## 2020-04-26 PROCEDURE — 97110 THERAPEUTIC EXERCISES: CPT

## 2020-04-26 PROCEDURE — 25010000002 HALOPERIDOL LACTATE PER 5 MG: Performed by: INTERNAL MEDICINE

## 2020-04-26 PROCEDURE — 25010000002 CEFTRIAXONE PER 250 MG: Performed by: INTERNAL MEDICINE

## 2020-04-26 PROCEDURE — 94799 UNLISTED PULMONARY SVC/PX: CPT

## 2020-04-26 PROCEDURE — 85025 COMPLETE CBC W/AUTO DIFF WBC: CPT | Performed by: INTERNAL MEDICINE

## 2020-04-26 PROCEDURE — 25010000002 METHYLPREDNISOLONE PER 125 MG: Performed by: INTERNAL MEDICINE

## 2020-04-26 RX ORDER — DIAPER,BRIEF,INFANT-TODD,DISP
EACH MISCELLANEOUS EVERY 12 HOURS SCHEDULED
Status: DISCONTINUED | OUTPATIENT
Start: 2020-04-26 | End: 2020-04-29 | Stop reason: HOSPADM

## 2020-04-26 RX ORDER — HALOPERIDOL 5 MG/ML
1 INJECTION INTRAMUSCULAR EVERY 6 HOURS PRN
Status: DISCONTINUED | OUTPATIENT
Start: 2020-04-26 | End: 2020-04-27

## 2020-04-26 RX ADMIN — ISOSORBIDE MONONITRATE 10 MG: 20 TABLET ORAL at 17:51

## 2020-04-26 RX ADMIN — ISOSORBIDE MONONITRATE 10 MG: 20 TABLET ORAL at 08:05

## 2020-04-26 RX ADMIN — ATORVASTATIN CALCIUM 80 MG: 40 TABLET, FILM COATED ORAL at 20:13

## 2020-04-26 RX ADMIN — IPRATROPIUM BROMIDE AND ALBUTEROL SULFATE 3 ML: 2.5; .5 SOLUTION RESPIRATORY (INHALATION) at 19:23

## 2020-04-26 RX ADMIN — IPRATROPIUM BROMIDE AND ALBUTEROL SULFATE 3 ML: 2.5; .5 SOLUTION RESPIRATORY (INHALATION) at 06:31

## 2020-04-26 RX ADMIN — LEVOTHYROXINE SODIUM 25 MCG: 25 TABLET ORAL at 08:05

## 2020-04-26 RX ADMIN — METOPROLOL TARTRATE 25 MG: 25 TABLET, FILM COATED ORAL at 20:13

## 2020-04-26 RX ADMIN — SODIUM CHLORIDE, PRESERVATIVE FREE 10 ML: 5 INJECTION INTRAVENOUS at 08:09

## 2020-04-26 RX ADMIN — DOXYCYCLINE 100 MG: 100 INJECTION, POWDER, LYOPHILIZED, FOR SOLUTION INTRAVENOUS at 20:14

## 2020-04-26 RX ADMIN — ASPIRIN 81 MG 81 MG: 81 TABLET ORAL at 08:05

## 2020-04-26 RX ADMIN — METOPROLOL TARTRATE 25 MG: 25 TABLET, FILM COATED ORAL at 08:05

## 2020-04-26 RX ADMIN — INSULIN DETEMIR 5 UNITS: 100 INJECTION, SOLUTION SUBCUTANEOUS at 08:17

## 2020-04-26 RX ADMIN — SODIUM CHLORIDE 1 G: 900 INJECTION INTRAVENOUS at 08:05

## 2020-04-26 RX ADMIN — IPRATROPIUM BROMIDE AND ALBUTEROL SULFATE 3 ML: 2.5; .5 SOLUTION RESPIRATORY (INHALATION) at 12:29

## 2020-04-26 RX ADMIN — HALOPERIDOL LACTATE 1 MG: 5 INJECTION, SOLUTION INTRAMUSCULAR at 17:58

## 2020-04-26 RX ADMIN — INSULIN LISPRO 3 UNITS: 100 INJECTION, SOLUTION INTRAVENOUS; SUBCUTANEOUS at 08:17

## 2020-04-26 RX ADMIN — DOXYCYCLINE 100 MG: 100 INJECTION, POWDER, LYOPHILIZED, FOR SOLUTION INTRAVENOUS at 08:54

## 2020-04-26 RX ADMIN — QUETIAPINE FUMARATE 25 MG: 25 TABLET ORAL at 20:13

## 2020-04-26 RX ADMIN — METHYLPREDNISOLONE SODIUM SUCCINATE 60 MG: 125 INJECTION, POWDER, FOR SOLUTION INTRAMUSCULAR; INTRAVENOUS at 11:53

## 2020-04-26 RX ADMIN — PANTOPRAZOLE SODIUM 40 MG: 40 TABLET, DELAYED RELEASE ORAL at 06:22

## 2020-04-27 LAB
ANA SER QL: POSITIVE
ANION GAP SERPL CALCULATED.3IONS-SCNC: 19 MMOL/L (ref 5–15)
BASOPHILS # BLD AUTO: 0.01 10*3/MM3 (ref 0–0.2)
BASOPHILS NFR BLD AUTO: 0.1 % (ref 0–1.5)
BUN BLD-MCNC: 37 MG/DL (ref 8–23)
BUN/CREAT SERPL: 22.4 (ref 7–25)
CALCIUM SPEC-SCNC: 9.8 MG/DL (ref 8.6–10.5)
CENTROMERE B AB SER-ACNC: <0.2 AI (ref 0–0.9)
CHLORIDE SERPL-SCNC: 105 MMOL/L (ref 98–107)
CHROMATIN AB SERPL-ACNC: <0.2 AI (ref 0–0.9)
CO2 SERPL-SCNC: 24 MMOL/L (ref 22–29)
CREAT BLD-MCNC: 1.65 MG/DL (ref 0.76–1.27)
DEPRECATED RDW RBC AUTO: 62.7 FL (ref 37–54)
DSDNA AB SER-ACNC: <1 IU/ML (ref 0–9)
ENA JO1 AB SER-ACNC: <0.2 AI (ref 0–0.9)
ENA RNP AB SER-ACNC: 0.4 AI (ref 0–0.9)
ENA SCL70 AB SER-ACNC: <0.2 AI (ref 0–0.9)
ENA SM AB SER-ACNC: <0.2 AI (ref 0–0.9)
ENA SS-A AB SER-ACNC: >8 AI (ref 0–0.9)
ENA SS-B AB SER-ACNC: <0.2 AI (ref 0–0.9)
EOSINOPHIL # BLD AUTO: 0 10*3/MM3 (ref 0–0.4)
EOSINOPHIL NFR BLD AUTO: 0 % (ref 0.3–6.2)
ERYTHROCYTE [DISTWIDTH] IN BLOOD BY AUTOMATED COUNT: 17.5 % (ref 12.3–15.4)
GFR SERPL CREATININE-BSD FRML MDRD: 40 ML/MIN/1.73
GLUCOSE BLD-MCNC: 234 MG/DL (ref 65–99)
GLUCOSE BLDC GLUCOMTR-MCNC: 131 MG/DL (ref 70–130)
GLUCOSE BLDC GLUCOMTR-MCNC: 224 MG/DL (ref 70–130)
GLUCOSE BLDC GLUCOMTR-MCNC: 227 MG/DL (ref 70–130)
GLUCOSE BLDC GLUCOMTR-MCNC: 230 MG/DL (ref 70–130)
HCT VFR BLD AUTO: 33.7 % (ref 37.5–51)
HGB BLD-MCNC: 10.4 G/DL (ref 13–17.7)
IMM GRANULOCYTES # BLD AUTO: 0.04 10*3/MM3 (ref 0–0.05)
IMM GRANULOCYTES NFR BLD AUTO: 0.4 % (ref 0–0.5)
LYMPHOCYTES # BLD AUTO: 0.79 10*3/MM3 (ref 0.7–3.1)
LYMPHOCYTES NFR BLD AUTO: 8.4 % (ref 19.6–45.3)
Lab: ABNORMAL
MCH RBC QN AUTO: 30.8 PG (ref 26.6–33)
MCHC RBC AUTO-ENTMCNC: 30.9 G/DL (ref 31.5–35.7)
MCV RBC AUTO: 99.7 FL (ref 79–97)
MONOCYTES # BLD AUTO: 0.72 10*3/MM3 (ref 0.1–0.9)
MONOCYTES NFR BLD AUTO: 7.6 % (ref 5–12)
NEUTROPHILS # BLD AUTO: 7.88 10*3/MM3 (ref 1.7–7)
NEUTROPHILS NFR BLD AUTO: 83.5 % (ref 42.7–76)
NRBC BLD AUTO-RTO: 0 /100 WBC (ref 0–0.2)
PLATELET # BLD AUTO: 254 10*3/MM3 (ref 140–450)
PMV BLD AUTO: 10.7 FL (ref 6–12)
POTASSIUM BLD-SCNC: 3.6 MMOL/L (ref 3.5–5.2)
RBC # BLD AUTO: 3.38 10*6/MM3 (ref 4.14–5.8)
SODIUM BLD-SCNC: 148 MMOL/L (ref 136–145)
WBC NRBC COR # BLD: 9.44 10*3/MM3 (ref 3.4–10.8)

## 2020-04-27 PROCEDURE — 25010000002 CEFTRIAXONE PER 250 MG: Performed by: INTERNAL MEDICINE

## 2020-04-27 PROCEDURE — 99233 SBSQ HOSP IP/OBS HIGH 50: CPT | Performed by: INTERNAL MEDICINE

## 2020-04-27 PROCEDURE — 82962 GLUCOSE BLOOD TEST: CPT

## 2020-04-27 PROCEDURE — 63710000001 INSULIN DETEMIR PER 5 UNITS: Performed by: INTERNAL MEDICINE

## 2020-04-27 PROCEDURE — 25010000002 ENOXAPARIN PER 10 MG: Performed by: FAMILY MEDICINE

## 2020-04-27 PROCEDURE — 80048 BASIC METABOLIC PNL TOTAL CA: CPT | Performed by: INTERNAL MEDICINE

## 2020-04-27 PROCEDURE — 97535 SELF CARE MNGMENT TRAINING: CPT

## 2020-04-27 PROCEDURE — 25010000002 ZIPRASIDONE MESYLATE PER 10 MG: Performed by: FAMILY MEDICINE

## 2020-04-27 PROCEDURE — 25010000002 HALOPERIDOL LACTATE PER 5 MG: Performed by: INTERNAL MEDICINE

## 2020-04-27 PROCEDURE — 85025 COMPLETE CBC W/AUTO DIFF WBC: CPT | Performed by: INTERNAL MEDICINE

## 2020-04-27 PROCEDURE — 99233 SBSQ HOSP IP/OBS HIGH 50: CPT | Performed by: FAMILY MEDICINE

## 2020-04-27 PROCEDURE — 99232 SBSQ HOSP IP/OBS MODERATE 35: CPT | Performed by: INTERNAL MEDICINE

## 2020-04-27 PROCEDURE — 94799 UNLISTED PULMONARY SVC/PX: CPT

## 2020-04-27 PROCEDURE — 92610 EVALUATE SWALLOWING FUNCTION: CPT

## 2020-04-27 PROCEDURE — 25010000002 MORPHINE PER 10 MG: Performed by: FAMILY MEDICINE

## 2020-04-27 PROCEDURE — 63710000001 PREDNISONE PER 1 MG: Performed by: FAMILY MEDICINE

## 2020-04-27 RX ORDER — LORAZEPAM 2 MG/ML
0.5 INJECTION INTRAMUSCULAR EVERY 4 HOURS PRN
Status: DISCONTINUED | OUTPATIENT
Start: 2020-04-27 | End: 2020-04-29 | Stop reason: HOSPADM

## 2020-04-27 RX ORDER — PREDNISONE 20 MG/1
20 TABLET ORAL
Status: DISCONTINUED | OUTPATIENT
Start: 2020-04-27 | End: 2020-04-27

## 2020-04-27 RX ORDER — MORPHINE SULFATE 2 MG/ML
2 INJECTION, SOLUTION INTRAMUSCULAR; INTRAVENOUS
Status: DISCONTINUED | OUTPATIENT
Start: 2020-04-27 | End: 2020-04-29 | Stop reason: HOSPADM

## 2020-04-27 RX ORDER — QUETIAPINE FUMARATE 25 MG/1
12.5 TABLET, FILM COATED ORAL 2 TIMES DAILY PRN
Status: DISCONTINUED | OUTPATIENT
Start: 2020-04-27 | End: 2020-04-29 | Stop reason: HOSPADM

## 2020-04-27 RX ORDER — QUETIAPINE FUMARATE 25 MG/1
12.5 TABLET, FILM COATED ORAL EVERY MORNING
Status: DISCONTINUED | OUTPATIENT
Start: 2020-04-28 | End: 2020-04-29 | Stop reason: HOSPADM

## 2020-04-27 RX ORDER — ZIPRASIDONE MESYLATE 20 MG/ML
10 INJECTION, POWDER, LYOPHILIZED, FOR SOLUTION INTRAMUSCULAR ONCE AS NEEDED
Status: COMPLETED | OUTPATIENT
Start: 2020-04-27 | End: 2020-04-27

## 2020-04-27 RX ORDER — DOXYCYCLINE 100 MG/1
100 CAPSULE ORAL EVERY 12 HOURS SCHEDULED
Status: DISPENSED | OUTPATIENT
Start: 2020-04-27 | End: 2020-04-29

## 2020-04-27 RX ADMIN — HALOPERIDOL LACTATE 1 MG: 5 INJECTION, SOLUTION INTRAMUSCULAR at 09:47

## 2020-04-27 RX ADMIN — SODIUM CHLORIDE, PRESERVATIVE FREE 10 ML: 5 INJECTION INTRAVENOUS at 21:54

## 2020-04-27 RX ADMIN — IPRATROPIUM BROMIDE AND ALBUTEROL SULFATE 3 ML: 2.5; .5 SOLUTION RESPIRATORY (INHALATION) at 12:38

## 2020-04-27 RX ADMIN — ISOSORBIDE MONONITRATE 10 MG: 20 TABLET ORAL at 07:57

## 2020-04-27 RX ADMIN — METOPROLOL TARTRATE 25 MG: 25 TABLET, FILM COATED ORAL at 08:11

## 2020-04-27 RX ADMIN — ISOSORBIDE MONONITRATE 10 MG: 20 TABLET ORAL at 17:45

## 2020-04-27 RX ADMIN — HYDROCORTISONE: 1 CREAM TOPICAL at 21:56

## 2020-04-27 RX ADMIN — ZIPRASIDONE MESYLATE 10 MG: 20 INJECTION, POWDER, LYOPHILIZED, FOR SOLUTION INTRAMUSCULAR at 18:37

## 2020-04-27 RX ADMIN — HYDROCORTISONE: 1 CREAM TOPICAL at 08:11

## 2020-04-27 RX ADMIN — IPRATROPIUM BROMIDE AND ALBUTEROL SULFATE 3 ML: 2.5; .5 SOLUTION RESPIRATORY (INHALATION) at 19:18

## 2020-04-27 RX ADMIN — MORPHINE SULFATE 2 MG: 2 INJECTION, SOLUTION INTRAMUSCULAR; INTRAVENOUS at 21:53

## 2020-04-27 RX ADMIN — QUETIAPINE FUMARATE 25 MG: 25 TABLET ORAL at 21:59

## 2020-04-27 RX ADMIN — MICONAZOLE NITRATE: 2 CREAM TOPICAL at 11:56

## 2020-04-27 RX ADMIN — LEVOTHYROXINE SODIUM 25 MCG: 25 TABLET ORAL at 08:00

## 2020-04-27 RX ADMIN — METOPROLOL TARTRATE 25 MG: 25 TABLET, FILM COATED ORAL at 21:57

## 2020-04-27 RX ADMIN — DOXYCYCLINE 100 MG: 100 CAPSULE ORAL at 21:54

## 2020-04-27 RX ADMIN — IPRATROPIUM BROMIDE AND ALBUTEROL SULFATE 3 ML: 2.5; .5 SOLUTION RESPIRATORY (INHALATION) at 06:22

## 2020-04-27 RX ADMIN — QUETIAPINE FUMARATE 12.5 MG: 25 TABLET ORAL at 11:22

## 2020-04-27 RX ADMIN — PREDNISONE 20 MG: 20 TABLET ORAL at 11:56

## 2020-04-27 RX ADMIN — DOXYCYCLINE 100 MG: 100 INJECTION, POWDER, LYOPHILIZED, FOR SOLUTION INTRAVENOUS at 09:41

## 2020-04-27 RX ADMIN — ENOXAPARIN SODIUM 30 MG: 30 INJECTION SUBCUTANEOUS at 21:53

## 2020-04-27 RX ADMIN — BISACODYL 10 MG: 10 SUPPOSITORY RECTAL at 17:44

## 2020-04-27 RX ADMIN — INSULIN LISPRO 3 UNITS: 100 INJECTION, SOLUTION INTRAVENOUS; SUBCUTANEOUS at 11:56

## 2020-04-27 RX ADMIN — ASPIRIN 81 MG 81 MG: 81 TABLET ORAL at 08:00

## 2020-04-27 RX ADMIN — INSULIN LISPRO 3 UNITS: 100 INJECTION, SOLUTION INTRAVENOUS; SUBCUTANEOUS at 08:04

## 2020-04-27 RX ADMIN — SODIUM CHLORIDE, PRESERVATIVE FREE 10 ML: 5 INJECTION INTRAVENOUS at 08:10

## 2020-04-27 RX ADMIN — MICONAZOLE NITRATE: 2 CREAM TOPICAL at 21:53

## 2020-04-27 RX ADMIN — MORPHINE SULFATE 2 MG: 2 INJECTION, SOLUTION INTRAMUSCULAR; INTRAVENOUS at 17:45

## 2020-04-27 RX ADMIN — SODIUM CHLORIDE 1 G: 900 INJECTION INTRAVENOUS at 07:56

## 2020-04-27 RX ADMIN — INSULIN DETEMIR 5 UNITS: 100 INJECTION, SOLUTION SUBCUTANEOUS at 07:56

## 2020-04-28 PROCEDURE — 94799 UNLISTED PULMONARY SVC/PX: CPT

## 2020-04-28 PROCEDURE — 25010000002 MORPHINE PER 10 MG: Performed by: FAMILY MEDICINE

## 2020-04-28 PROCEDURE — 25010000002 ENOXAPARIN PER 10 MG: Performed by: FAMILY MEDICINE

## 2020-04-28 PROCEDURE — 99232 SBSQ HOSP IP/OBS MODERATE 35: CPT | Performed by: NURSE PRACTITIONER

## 2020-04-28 PROCEDURE — 25010000002 LORAZEPAM PER 2 MG: Performed by: FAMILY MEDICINE

## 2020-04-28 PROCEDURE — 99232 SBSQ HOSP IP/OBS MODERATE 35: CPT | Performed by: INTERNAL MEDICINE

## 2020-04-28 RX ORDER — IPRATROPIUM BROMIDE AND ALBUTEROL SULFATE 2.5; .5 MG/3ML; MG/3ML
3 SOLUTION RESPIRATORY (INHALATION) EVERY 6 HOURS PRN
Status: DISCONTINUED | OUTPATIENT
Start: 2020-04-28 | End: 2020-04-29 | Stop reason: HOSPADM

## 2020-04-28 RX ORDER — METOPROLOL TARTRATE 5 MG/5ML
5 INJECTION INTRAVENOUS EVERY 6 HOURS PRN
Status: DISCONTINUED | OUTPATIENT
Start: 2020-04-28 | End: 2020-04-29 | Stop reason: HOSPADM

## 2020-04-28 RX ADMIN — HYDROCORTISONE: 1 CREAM TOPICAL at 15:02

## 2020-04-28 RX ADMIN — MORPHINE SULFATE 2 MG: 2 INJECTION, SOLUTION INTRAMUSCULAR; INTRAVENOUS at 18:44

## 2020-04-28 RX ADMIN — DOXYCYCLINE 100 MG: 100 CAPSULE ORAL at 21:34

## 2020-04-28 RX ADMIN — ENOXAPARIN SODIUM 30 MG: 30 INJECTION SUBCUTANEOUS at 21:34

## 2020-04-28 RX ADMIN — MINERAL OIL: 1000 LIQUID ORAL at 21:34

## 2020-04-28 RX ADMIN — SODIUM CHLORIDE, PRESERVATIVE FREE 10 ML: 5 INJECTION INTRAVENOUS at 21:34

## 2020-04-28 RX ADMIN — IPRATROPIUM BROMIDE AND ALBUTEROL SULFATE 3 ML: 2.5; .5 SOLUTION RESPIRATORY (INHALATION) at 06:20

## 2020-04-28 RX ADMIN — QUETIAPINE FUMARATE 12.5 MG: 25 TABLET ORAL at 06:08

## 2020-04-28 RX ADMIN — MICONAZOLE NITRATE: 2 CREAM TOPICAL at 09:30

## 2020-04-28 RX ADMIN — QUETIAPINE FUMARATE 25 MG: 25 TABLET ORAL at 21:34

## 2020-04-28 RX ADMIN — HYDROCORTISONE: 1 CREAM TOPICAL at 21:35

## 2020-04-28 RX ADMIN — SODIUM CHLORIDE, PRESERVATIVE FREE 10 ML: 5 INJECTION INTRAVENOUS at 15:01

## 2020-04-28 RX ADMIN — MICONAZOLE NITRATE: 2 CREAM TOPICAL at 21:35

## 2020-04-28 RX ADMIN — LORAZEPAM 0.5 MG: 2 INJECTION INTRAMUSCULAR; INTRAVENOUS at 02:06

## 2020-04-28 RX ADMIN — MORPHINE SULFATE 2 MG: 2 INJECTION, SOLUTION INTRAMUSCULAR; INTRAVENOUS at 02:07

## 2020-04-28 RX ADMIN — METOPROLOL TARTRATE 25 MG: 25 TABLET, FILM COATED ORAL at 21:34

## 2020-04-28 RX ADMIN — MORPHINE SULFATE 2 MG: 2 INJECTION, SOLUTION INTRAMUSCULAR; INTRAVENOUS at 21:46

## 2020-04-28 RX ADMIN — LORAZEPAM 0.5 MG: 2 INJECTION INTRAMUSCULAR; INTRAVENOUS at 21:46

## 2020-04-28 RX ADMIN — MORPHINE SULFATE 2 MG: 2 INJECTION, SOLUTION INTRAMUSCULAR; INTRAVENOUS at 13:02

## 2020-04-28 RX ADMIN — PANTOPRAZOLE SODIUM 40 MG: 40 TABLET, DELAYED RELEASE ORAL at 06:08

## 2020-04-29 VITALS
RESPIRATION RATE: 12 BRPM | BODY MASS INDEX: 19.65 KG/M2 | SYSTOLIC BLOOD PRESSURE: 153 MMHG | WEIGHT: 132.7 LBS | HEART RATE: 98 BPM | DIASTOLIC BLOOD PRESSURE: 71 MMHG | OXYGEN SATURATION: 98 % | HEIGHT: 69 IN | TEMPERATURE: 97.5 F

## 2020-04-29 PROBLEM — R13.12 OROPHARYNGEAL DYSPHAGIA: Status: ACTIVE | Noted: 2020-04-29

## 2020-04-29 PROCEDURE — 25010000002 MORPHINE PER 10 MG: Performed by: FAMILY MEDICINE

## 2020-04-29 PROCEDURE — 25010000002 LORAZEPAM PER 2 MG: Performed by: FAMILY MEDICINE

## 2020-04-29 PROCEDURE — 99239 HOSP IP/OBS DSCHRG MGMT >30: CPT | Performed by: PHYSICIAN ASSISTANT

## 2020-04-29 RX ORDER — QUETIAPINE FUMARATE 25 MG/1
12.5 TABLET, FILM COATED ORAL EVERY MORNING
Start: 2020-04-30

## 2020-04-29 RX ORDER — QUETIAPINE FUMARATE 25 MG/1
25 TABLET, FILM COATED ORAL NIGHTLY
Start: 2020-04-29

## 2020-04-29 RX ORDER — ACETAMINOPHEN 325 MG/1
650 TABLET ORAL EVERY 4 HOURS PRN
Start: 2020-04-29

## 2020-04-29 RX ORDER — BISACODYL 10 MG
10 SUPPOSITORY, RECTAL RECTAL DAILY PRN
Start: 2020-04-29

## 2020-04-29 RX ORDER — QUETIAPINE FUMARATE 25 MG/1
12.5 TABLET, FILM COATED ORAL 2 TIMES DAILY PRN
Start: 2020-04-29

## 2020-04-29 RX ORDER — DIAPER,BRIEF,INFANT-TODD,DISP
EACH MISCELLANEOUS
Start: 2020-04-29

## 2020-04-29 RX ORDER — MORPHINE SULFATE 2 MG/ML
2 INJECTION, SOLUTION INTRAMUSCULAR; INTRAVENOUS
Start: 2020-04-29

## 2020-04-29 RX ORDER — LORAZEPAM 2 MG/ML
0.5 INJECTION INTRAMUSCULAR EVERY 4 HOURS PRN
Start: 2020-04-29

## 2020-04-29 RX ORDER — ISOSORBIDE MONONITRATE 10 MG/1
10 TABLET ORAL
Start: 2020-04-29

## 2020-04-29 RX ADMIN — MINERAL OIL: 1000 LIQUID ORAL at 15:25

## 2020-04-29 RX ADMIN — MICONAZOLE NITRATE: 2 CREAM TOPICAL at 08:40

## 2020-04-29 RX ADMIN — MORPHINE SULFATE 2 MG: 2 INJECTION, SOLUTION INTRAMUSCULAR; INTRAVENOUS at 14:42

## 2020-04-29 RX ADMIN — HYDROCORTISONE: 1 CREAM TOPICAL at 08:40

## 2020-04-29 RX ADMIN — MORPHINE SULFATE 2 MG: 2 INJECTION, SOLUTION INTRAMUSCULAR; INTRAVENOUS at 03:23

## 2020-04-29 RX ADMIN — MORPHINE SULFATE 2 MG: 2 INJECTION, SOLUTION INTRAMUSCULAR; INTRAVENOUS at 12:36

## 2020-04-29 RX ADMIN — MORPHINE SULFATE 2 MG: 2 INJECTION, SOLUTION INTRAMUSCULAR; INTRAVENOUS at 09:23

## 2020-04-29 RX ADMIN — LORAZEPAM 0.5 MG: 2 INJECTION INTRAMUSCULAR; INTRAVENOUS at 14:42

## 2020-04-29 RX ADMIN — LORAZEPAM 0.5 MG: 2 INJECTION INTRAMUSCULAR; INTRAVENOUS at 03:23

## 2020-04-29 RX ADMIN — MINERAL OIL: 1000 LIQUID ORAL at 08:41

## 2020-04-29 RX ADMIN — LORAZEPAM 0.5 MG: 2 INJECTION INTRAMUSCULAR; INTRAVENOUS at 09:23

## 2020-06-11 ENCOUNTER — DOCUMENTATION (OUTPATIENT)
Dept: CARDIAC REHAB | Facility: HOSPITAL | Age: 85
End: 2020-06-11

## 2020-06-11 NOTE — PROGRESS NOTES
Referral received for Phase II Cardiac Rehab.  Staff has reviewed chart and patient does not have a qualifying diagnosis for Phase II Cardiac Rehab at this time.  Staff available if further consultation is needed.

## 2021-12-01 NOTE — OUTREACH NOTE
Prep Survey      Responses   Facility patient discharged from?  [facility]   Is patient eligible?  Yes   Discharge diagnosis  CVA   Does the patient have one of the following disease processes/diagnoses(primary or secondary)?  Stroke (TIA)   Does the patient have Home health ordered?  No   Is there a DME ordered?  No   Prep survey completed?  Yes          Yakelin Monique RN         recent illness

## 2023-05-25 NOTE — TELEPHONE ENCOUNTER
Called pt's daughter, [place], and advised her to have pt stop [facility].     Pt's daughter verbalizes understanding and agreeable to plan.    show